# Patient Record
Sex: FEMALE | Race: WHITE | NOT HISPANIC OR LATINO | Employment: OTHER | URBAN - METROPOLITAN AREA
[De-identification: names, ages, dates, MRNs, and addresses within clinical notes are randomized per-mention and may not be internally consistent; named-entity substitution may affect disease eponyms.]

---

## 2019-09-04 ENCOUNTER — OFFICE VISIT (OUTPATIENT)
Dept: FAMILY MEDICINE CLINIC | Facility: CLINIC | Age: 59
End: 2019-09-04

## 2019-09-04 VITALS
WEIGHT: 150 LBS | BODY MASS INDEX: 28.32 KG/M2 | HEART RATE: 65 BPM | TEMPERATURE: 97.5 F | OXYGEN SATURATION: 97 % | SYSTOLIC BLOOD PRESSURE: 118 MMHG | DIASTOLIC BLOOD PRESSURE: 58 MMHG | HEIGHT: 61 IN

## 2019-09-04 DIAGNOSIS — R10.32 ABDOMINAL WALL PAIN IN LEFT LOWER QUADRANT: ICD-10-CM

## 2019-09-04 DIAGNOSIS — E89.0 POSTABLATIVE HYPOTHYROIDISM: Primary | ICD-10-CM

## 2019-09-04 DIAGNOSIS — B00.9 HSV INFECTION: ICD-10-CM

## 2019-09-04 DIAGNOSIS — K21.9 GASTROESOPHAGEAL REFLUX DISEASE WITHOUT ESOPHAGITIS: ICD-10-CM

## 2019-09-04 PROBLEM — E03.9 HYPOTHYROIDISM: Status: ACTIVE | Noted: 2019-09-04

## 2019-09-04 PROCEDURE — 99213 OFFICE O/P EST LOW 20 MIN: CPT | Performed by: FAMILY MEDICINE

## 2019-09-04 RX ORDER — VALACYCLOVIR HYDROCHLORIDE 500 MG/1
500 TABLET, FILM COATED ORAL DAILY
Qty: 90 TABLET | Refills: 3 | Status: SHIPPED | OUTPATIENT
Start: 2019-09-04 | End: 2020-10-12 | Stop reason: SDUPTHER

## 2019-09-04 RX ORDER — LEVOTHYROXINE SODIUM 137 MCG
137 TABLET ORAL DAILY
Qty: 90 TABLET | Refills: 3 | Status: SHIPPED | OUTPATIENT
Start: 2019-09-04 | End: 2020-10-06 | Stop reason: SDUPTHER

## 2019-09-04 RX ORDER — RANITIDINE 150 MG/1
150 TABLET ORAL 2 TIMES DAILY
Qty: 180 TABLET | Refills: 3 | Status: SHIPPED | OUTPATIENT
Start: 2019-09-04 | End: 2020-06-16 | Stop reason: RX

## 2019-09-04 RX ORDER — IBUPROFEN 800 MG/1
800 TABLET ORAL EVERY 6 HOURS PRN
Qty: 90 TABLET | Refills: 3 | Status: SHIPPED | OUTPATIENT
Start: 2019-09-04 | End: 2020-09-18 | Stop reason: SDUPTHER

## 2019-09-04 NOTE — PROGRESS NOTES
Subjective   Jackie Frankel is a 59 y.o. female.     Chief Complaint   Patient presents with   • Hypothyroidism        Patient here for routine checkup.  She is doing well with her thyroid and her reflux.  She is compliant with her medications and has no concerns with those today.  We do not have her old records available from the old office today except for the last labs which were good.  She does also complain of some left lower quadrant pain that is intermittent.  It is a sharp stabbing pain and is worse with movements.  She does not have any other aggravating factors that she knows.  It does not last very long.  She usually just lets it go away on its own.  She denies any change in her bowel such as black or tarry stools or constipation or diarrhea.  She denies any dysuria.  She is up-to-date on her colonoscopy and just had it recently.           The following portions of the patient's history were reviewed and updated as appropriate: allergies, current medications, past family history, past medical history, past social history, past surgical history and problem list.    Past Medical History:   Diagnosis Date   • Abnormal liver function test    • Abnormal Pap smear of vagina     Abnormal Pap smear of vagina and vaginal HPV   • Anxiety disorder    • Arthralgia of multiple sites    • BMI 27.0-27.9,adult    • Callus of foot    • Cervicalgia    • Colon polyps    • Eczema    • Edema extremities    • Encounter for gynecological examination without abnormal finding    • Encounter for long-term (current) use of medications    • Encounter for screening colonoscopy    • Encounter for screening for lipoid disorders    • Encounter for screening for malignant neoplasm of colon    • Generalized osteoarthritis of hand    • GERD (gastroesophageal reflux disease)    • HSV infection    • Hypothyroidism    • Left knee pain    • Menopausal symptoms    • Neck pain    • Neoplasm of uncertain behavior of skin    • Otalgia of right ear     • Pain in left shoulder    • Sleep disturbance    • Sore throat    • Strep pharyngitis    • Thyroid disease    • Viral warts    • Visit for screening mammogram        Past Surgical History:   Procedure Laterality Date   • COLONOSCOPY      Due to BRBPR 2002, 2014   • DILATATION AND CURETTAGE  2006   • EYE SURGERY         Family History   Problem Relation Age of Onset   • Arthritis Mother    • Dementia Mother    • Uterine cancer Mother    • Cirrhosis Father        Social History     Socioeconomic History   • Marital status:      Spouse name: Not on file   • Number of children: Not on file   • Years of education: Not on file   • Highest education level: Not on file   Tobacco Use   • Smoking status: Never Smoker   • Smokeless tobacco: Never Used   Substance and Sexual Activity   • Alcohol use: Yes     Frequency: Never     Comment: drinks about 5 times per year   • Drug use: No         Current Outpatient Medications:   •  ibuprofen (ADVIL,MOTRIN) 800 MG tablet, Take 1 tablet by mouth Every 6 (Six) Hours As Needed for Mild Pain ., Disp: 90 tablet, Rfl: 3  •  raNITIdine (ZANTAC) 150 MG tablet, Take 1 tablet by mouth 2 (Two) Times a Day., Disp: 180 tablet, Rfl: 3  •  SYNTHROID 137 MCG tablet, Take 1 tablet by mouth Daily., Disp: 90 tablet, Rfl: 3  •  valACYclovir (VALTREX) 500 MG tablet, Take 1 tablet by mouth Daily., Disp: 90 tablet, Rfl: 3    Review of Systems   Constitutional: Negative for chills, fatigue and fever.   HENT: Negative for congestion, rhinorrhea and sore throat.    Respiratory: Negative for cough and shortness of breath.    Cardiovascular: Negative for chest pain and leg swelling.   Gastrointestinal: Positive for abdominal pain.   Endocrine: Negative for polydipsia and polyuria.   Genitourinary: Negative for dysuria.   Musculoskeletal: Negative for arthralgias and myalgias.   Skin: Negative for rash.   Neurological: Negative for dizziness.   Hematological: Does not bruise/bleed easily.  "  Psychiatric/Behavioral: Negative for sleep disturbance.       Objective   Vitals:    09/04/19 0937   BP: 118/58   Pulse: 65   Temp: 97.5 °F (36.4 °C)   SpO2: 97%   Weight: 68 kg (150 lb)   Height: 154.9 cm (61\")     Body mass index is 28.34 kg/m².  Physical Exam   Constitutional: She is oriented to person, place, and time. She appears well-developed and well-nourished.   HENT:   Head: Normocephalic and atraumatic.   Eyes: Conjunctivae and EOM are normal. Pupils are equal, round, and reactive to light.   Neck: Neck supple. No thyromegaly present.   Cardiovascular: Normal rate and regular rhythm.   No murmur heard.  Pulmonary/Chest: Effort normal and breath sounds normal. She has no wheezes.   Abdominal: Soft. She exhibits no distension and no mass. There is no tenderness. There is no rebound and no guarding. No hernia.   She describes the area where she feels the pain as a region of the abdominal wall right above the left iliac crest.   Musculoskeletal: Normal range of motion.   Neurological: She is alert and oriented to person, place, and time. No cranial nerve deficit.   Skin: Skin is warm and dry.   Psychiatric: She has a normal mood and affect.   Vitals reviewed.        Assessment/Plan   Audelia was seen today for hypothyroidism.    Diagnoses and all orders for this visit:    Postablative hypothyroidism  -     TSH  -     SYNTHROID 137 MCG tablet; Take 1 tablet by mouth Daily.    Gastroesophageal reflux disease without esophagitis  -     raNITIdine (ZANTAC) 150 MG tablet; Take 1 tablet by mouth 2 (Two) Times a Day.    HSV infection  -     valACYclovir (VALTREX) 500 MG tablet; Take 1 tablet by mouth Daily.    Abdominal wall pain in left lower quadrant    Other orders  -     ibuprofen (ADVIL,MOTRIN) 800 MG tablet; Take 1 tablet by mouth Every 6 (Six) Hours As Needed for Mild Pain .               Patient Instructions   Let me know if pain in abdomen gets worse.       "

## 2019-09-05 LAB — TSH SERPL DL<=0.005 MIU/L-ACNC: 0.75 UIU/ML (ref 0.27–4.2)

## 2020-01-15 ENCOUNTER — OFFICE VISIT (OUTPATIENT)
Dept: FAMILY MEDICINE CLINIC | Facility: CLINIC | Age: 60
End: 2020-01-15

## 2020-01-15 VITALS
HEIGHT: 61 IN | TEMPERATURE: 98.2 F | SYSTOLIC BLOOD PRESSURE: 98 MMHG | OXYGEN SATURATION: 99 % | WEIGHT: 155.2 LBS | DIASTOLIC BLOOD PRESSURE: 58 MMHG | HEART RATE: 78 BPM | BODY MASS INDEX: 29.3 KG/M2

## 2020-01-15 DIAGNOSIS — L30.9 ECZEMA, UNSPECIFIED TYPE: ICD-10-CM

## 2020-01-15 DIAGNOSIS — R30.0 DYSURIA: ICD-10-CM

## 2020-01-15 DIAGNOSIS — N30.01 ACUTE CYSTITIS WITH HEMATURIA: Primary | ICD-10-CM

## 2020-01-15 LAB
BILIRUB BLD-MCNC: ABNORMAL MG/DL
GLUCOSE UR STRIP-MCNC: NEGATIVE MG/DL
KETONES UR QL: ABNORMAL
LEUKOCYTE EST, POC: ABNORMAL
NITRITE UR-MCNC: NEGATIVE MG/ML
PH UR: 5.5 [PH] (ref 5–8)
PROT UR STRIP-MCNC: ABNORMAL MG/DL
RBC # UR STRIP: ABNORMAL /UL
SP GR UR: 1.03 (ref 1–1.03)
UROBILINOGEN UR QL: NORMAL

## 2020-01-15 PROCEDURE — 99213 OFFICE O/P EST LOW 20 MIN: CPT | Performed by: FAMILY MEDICINE

## 2020-01-15 PROCEDURE — 81003 URINALYSIS AUTO W/O SCOPE: CPT | Performed by: FAMILY MEDICINE

## 2020-01-15 RX ORDER — NITROFURANTOIN 25; 75 MG/1; MG/1
100 CAPSULE ORAL 2 TIMES DAILY
Qty: 10 CAPSULE | Refills: 0 | Status: SHIPPED | OUTPATIENT
Start: 2020-01-15 | End: 2020-03-04

## 2020-01-15 RX ORDER — TRIAMCINOLONE ACETONIDE 1 MG/G
CREAM TOPICAL 2 TIMES DAILY
Qty: 80 G | Refills: 2 | Status: SHIPPED | OUTPATIENT
Start: 2020-01-15 | End: 2021-07-06 | Stop reason: SDUPTHER

## 2020-01-15 NOTE — PROGRESS NOTES
Subjective   Jackie Frankel is a 59 y.o. female.     Chief Complaint   Patient presents with   • Difficulty Urinating        Patient was feeling fine until earlier today when she went to the bathroom and felt some pain and urgency in her bladder.  She is not had any fever chills nausea or vomiting.  She is had some frequency since then.  She has not noticed any blood in her urine.         The following portions of the patient's history were reviewed and updated as appropriate: allergies, current medications, past family history, past medical history, past social history, past surgical history and problem list.    Past Medical History:   Diagnosis Date   • Abnormal liver function test    • Abnormal Pap smear of vagina     Abnormal Pap smear of vagina and vaginal HPV   • Anxiety disorder    • Arthralgia of multiple sites    • BMI 27.0-27.9,adult    • Callus of foot    • Cervicalgia    • Colon polyps    • Eczema    • Edema extremities    • Encounter for gynecological examination without abnormal finding    • Encounter for long-term (current) use of medications    • Encounter for screening colonoscopy    • Encounter for screening for lipoid disorders    • Encounter for screening for malignant neoplasm of colon    • Generalized osteoarthritis of hand    • GERD (gastroesophageal reflux disease)    • HSV infection    • Hypothyroidism    • Left knee pain    • Menopausal symptoms    • Neck pain    • Neoplasm of uncertain behavior of skin    • Otalgia of right ear    • Pain in left shoulder    • Sleep disturbance    • Sore throat    • Strep pharyngitis    • Thyroid disease    • Viral warts    • Visit for screening mammogram        Past Surgical History:   Procedure Laterality Date   • COLONOSCOPY      Due to BRBPR 2002, 2014   • DILATATION AND CURETTAGE  2006   • EYE SURGERY         Family History   Problem Relation Age of Onset   • Arthritis Mother    • Dementia Mother    • Uterine cancer Mother    • Cirrhosis Father         Social History     Socioeconomic History   • Marital status:      Spouse name: Not on file   • Number of children: Not on file   • Years of education: Not on file   • Highest education level: Not on file   Tobacco Use   • Smoking status: Never Smoker   • Smokeless tobacco: Never Used   Substance and Sexual Activity   • Alcohol use: Yes     Frequency: Never     Comment: drinks about 5 times per year   • Drug use: No         Current Outpatient Medications:   •  ibuprofen (ADVIL,MOTRIN) 800 MG tablet, Take 1 tablet by mouth Every 6 (Six) Hours As Needed for Mild Pain ., Disp: 90 tablet, Rfl: 3  •  raNITIdine (ZANTAC) 150 MG tablet, Take 1 tablet by mouth 2 (Two) Times a Day., Disp: 180 tablet, Rfl: 3  •  SYNTHROID 137 MCG tablet, Take 1 tablet by mouth Daily., Disp: 90 tablet, Rfl: 3  •  valACYclovir (VALTREX) 500 MG tablet, Take 1 tablet by mouth Daily., Disp: 90 tablet, Rfl: 3  •  nitrofurantoin, macrocrystal-monohydrate, (MACROBID) 100 MG capsule, Take 1 capsule by mouth 2 (Two) Times a Day., Disp: 10 capsule, Rfl: 0  •  triamcinolone (KENALOG) 0.1 % cream, Apply  topically to the appropriate area as directed 2 (Two) Times a Day., Disp: 80 g, Rfl: 2    Review of Systems   Constitutional: Negative for chills, fatigue and fever.   HENT: Negative for congestion, rhinorrhea and sore throat.    Respiratory: Negative for cough and shortness of breath.    Cardiovascular: Negative for chest pain and leg swelling.   Gastrointestinal: Negative for abdominal pain, nausea and vomiting.   Endocrine: Negative for polydipsia and polyuria.   Genitourinary: Positive for dysuria, frequency, pelvic pain and urgency. Negative for flank pain.   Musculoskeletal: Negative for arthralgias and myalgias.   Skin: Negative for rash.   Neurological: Negative for dizziness.   Hematological: Does not bruise/bleed easily.   Psychiatric/Behavioral: Negative for sleep disturbance.       Objective   Vitals:    01/15/20 1420   BP: 98/58  "  Pulse: 78   Temp: 98.2 °F (36.8 °C)   SpO2: 99%   Weight: 70.4 kg (155 lb 3.2 oz)   Height: 154.9 cm (61\")     Body mass index is 29.32 kg/m².  Physical Exam   Constitutional: She is oriented to person, place, and time. She appears well-developed and well-nourished.   HENT:   Head: Normocephalic and atraumatic.   Eyes: Pupils are equal, round, and reactive to light. Conjunctivae and EOM are normal.   Neck: Neck supple. No thyromegaly present.   Cardiovascular: Normal rate and regular rhythm.   No murmur heard.  Pulmonary/Chest: Effort normal and breath sounds normal. She has no wheezes.   Abdominal: Soft. Bowel sounds are normal. There is no tenderness.   Musculoskeletal: Normal range of motion.   Neurological: She is alert and oriented to person, place, and time. No cranial nerve deficit.   Skin: Skin is warm and dry.   Psychiatric: She has a normal mood and affect.       Office Visit on 01/15/2020   Component Date Value Ref Range Status   • Specific Gravity  01/15/2020 1.030  1.005 - 1.030 Final   • pH, Urine 01/15/2020 5.5  5.0 - 8.0 Final   • Leukocytes 01/15/2020 Moderate (2+)* Negative Final   • Nitrite, UA 01/15/2020 Negative  Negative Final   • Protein, POC 01/15/2020 2+* Negative mg/dL Final   • Glucose, UA 01/15/2020 Negative  Negative, 1000 mg/dL (3+) mg/dL Final   • Ketones, UA 01/15/2020 Trace* Negative Final   • Urobilinogen, UA 01/15/2020 Normal  Normal Final   • Bilirubin 01/15/2020 Moderate (2+)* Negative Final   • Blood, UA 01/15/2020 3+* Negative Final          Assessment/Plan   Audelia was seen today for difficulty urinating.    Diagnoses and all orders for this visit:    Acute cystitis with hematuria  -     nitrofurantoin, macrocrystal-monohydrate, (MACROBID) 100 MG capsule; Take 1 capsule by mouth 2 (Two) Times a Day.    Dysuria  -     POC Urinalysis Dipstick, Automated  -     Urine Culture - Urine, Urine, Clean Catch    Eczema, unspecified type  -     triamcinolone (KENALOG) 0.1 % cream; " Apply  topically to the appropriate area as directed 2 (Two) Times a Day.               Patient Instructions   Push fluids.

## 2020-01-18 LAB
BACTERIA UR CULT: ABNORMAL
BACTERIA UR CULT: ABNORMAL
OTHER ANTIBIOTIC SUSC ISLT: ABNORMAL

## 2020-03-04 ENCOUNTER — OFFICE VISIT (OUTPATIENT)
Dept: FAMILY MEDICINE CLINIC | Facility: CLINIC | Age: 60
End: 2020-03-04

## 2020-03-04 VITALS
DIASTOLIC BLOOD PRESSURE: 76 MMHG | HEIGHT: 61 IN | HEART RATE: 73 BPM | OXYGEN SATURATION: 98 % | WEIGHT: 153.8 LBS | BODY MASS INDEX: 29.04 KG/M2 | TEMPERATURE: 97.6 F | SYSTOLIC BLOOD PRESSURE: 110 MMHG

## 2020-03-04 DIAGNOSIS — L30.9 ECZEMA, UNSPECIFIED TYPE: ICD-10-CM

## 2020-03-04 DIAGNOSIS — E78.5 HYPERLIPIDEMIA, UNSPECIFIED HYPERLIPIDEMIA TYPE: ICD-10-CM

## 2020-03-04 DIAGNOSIS — M25.552 ISCHIAL PAIN, LEFT: ICD-10-CM

## 2020-03-04 DIAGNOSIS — E89.0 POSTABLATIVE HYPOTHYROIDISM: Primary | ICD-10-CM

## 2020-03-04 DIAGNOSIS — G47.9 SLEEP DISTURBANCE: ICD-10-CM

## 2020-03-04 DIAGNOSIS — B00.9 HSV INFECTION: ICD-10-CM

## 2020-03-04 DIAGNOSIS — K21.9 GASTROESOPHAGEAL REFLUX DISEASE WITHOUT ESOPHAGITIS: ICD-10-CM

## 2020-03-04 PROCEDURE — 99214 OFFICE O/P EST MOD 30 MIN: CPT | Performed by: FAMILY MEDICINE

## 2020-03-04 NOTE — PATIENT INSTRUCTIONS
Try the melatonin at 10 mg at night.  Increase exercise to help sleep.  Establish a more consistent wake time and bedtime.  Try building up your gluteus muscles for the ischial pain.  Squats can help.  You can also use a gel cushion.

## 2020-03-04 NOTE — PROGRESS NOTES
Subjective   Jackie Frankel is a 60 y.o. female.     Chief Complaint   Patient presents with   • Hypothyroidism        Here for routine thyroid check up.  Compliant with meds.  She is fasting for her cholesterol check as well.  She has controlled HSV and takes daily Valtrex for that.  Her eczema has been stable with her triamcinolone.  She still requires medicine for her reflux on a daily basis.  Pain in left buttock for the past few months after sitting for a while.  She denies any pain in the hip and denies any injuries.  Has not been sleeping well for some time.  Has tried up to 5 mg melatonin.  Sometimes gets up to 6 hours.    She has an irregular bedtime and wake up time due to babysitting grandchildren.  She is due for an annual physical.           The following portions of the patient's history were reviewed and updated as appropriate: allergies, current medications, past family history, past medical history, past social history, past surgical history and problem list.    Past Medical History:   Diagnosis Date   • Abnormal liver function test    • Abnormal Pap smear of vagina     Abnormal Pap smear of vagina and vaginal HPV   • Anxiety disorder    • Arthralgia of multiple sites    • BMI 27.0-27.9,adult    • Callus of foot    • Cervicalgia    • Colon polyps    • Eczema    • Edema extremities    • Encounter for gynecological examination without abnormal finding    • Encounter for long-term (current) use of medications    • Encounter for screening colonoscopy    • Encounter for screening for lipoid disorders    • Encounter for screening for malignant neoplasm of colon    • Generalized osteoarthritis of hand    • GERD (gastroesophageal reflux disease)    • HSV infection    • Hypothyroidism    • Left knee pain    • Menopausal symptoms    • Neck pain    • Neoplasm of uncertain behavior of skin    • Otalgia of right ear    • Pain in left shoulder    • Sleep disturbance    • Sore throat    • Strep pharyngitis    •  Thyroid disease    • Viral warts    • Visit for screening mammogram        Past Surgical History:   Procedure Laterality Date   • COLONOSCOPY      Due to BRBPR 2002, 2014   • DILATATION AND CURETTAGE  2006   • EYE SURGERY         Family History   Problem Relation Age of Onset   • Arthritis Mother    • Dementia Mother    • Uterine cancer Mother    • Cirrhosis Father        Social History     Socioeconomic History   • Marital status:      Spouse name: Not on file   • Number of children: Not on file   • Years of education: Not on file   • Highest education level: Not on file   Tobacco Use   • Smoking status: Never Smoker   • Smokeless tobacco: Never Used   Substance and Sexual Activity   • Alcohol use: Yes     Frequency: Never     Comment: drinks about 5 times per year   • Drug use: No         Current Outpatient Medications:   •  ibuprofen (ADVIL,MOTRIN) 800 MG tablet, Take 1 tablet by mouth Every 6 (Six) Hours As Needed for Mild Pain ., Disp: 90 tablet, Rfl: 3  •  raNITIdine (ZANTAC) 150 MG tablet, Take 1 tablet by mouth 2 (Two) Times a Day., Disp: 180 tablet, Rfl: 3  •  SYNTHROID 137 MCG tablet, Take 1 tablet by mouth Daily., Disp: 90 tablet, Rfl: 3  •  triamcinolone (KENALOG) 0.1 % cream, Apply  topically to the appropriate area as directed 2 (Two) Times a Day., Disp: 80 g, Rfl: 2  •  valACYclovir (VALTREX) 500 MG tablet, Take 1 tablet by mouth Daily., Disp: 90 tablet, Rfl: 3    Review of Systems   Constitutional: Negative for chills, fatigue and fever.   HENT: Negative for congestion, rhinorrhea and sore throat.    Respiratory: Negative for cough and shortness of breath.    Cardiovascular: Negative for chest pain and leg swelling.   Gastrointestinal: Negative for abdominal pain.   Endocrine: Negative for polydipsia and polyuria.   Genitourinary: Negative for dysuria.   Musculoskeletal: Positive for arthralgias. Negative for myalgias.   Skin: Negative for rash.   Neurological: Negative for dizziness.  "  Hematological: Does not bruise/bleed easily.   Psychiatric/Behavioral: Positive for sleep disturbance.       Objective   Vitals:    03/04/20 0802   BP: 110/76   Pulse: 73   Temp: 97.6 °F (36.4 °C)   SpO2: 98%   Weight: 69.8 kg (153 lb 12.8 oz)   Height: 154.9 cm (61\")     Body mass index is 29.06 kg/m².  Physical Exam   Constitutional: She is oriented to person, place, and time. She appears well-developed and well-nourished.   HENT:   Head: Normocephalic and atraumatic.   Eyes: Pupils are equal, round, and reactive to light. Conjunctivae and EOM are normal.   Neck: Neck supple. No thyromegaly present.   Cardiovascular: Normal rate and regular rhythm.   No murmur heard.  Pulmonary/Chest: Effort normal and breath sounds normal. She has no wheezes.   Abdominal: Soft.   Musculoskeletal: Normal range of motion.   Hip nontender with full range of motion.  Patient is point tender over ischium on the left.   Neurological: She is alert and oriented to person, place, and time. No cranial nerve deficit.   Skin: Skin is warm and dry.   Psychiatric: She has a normal mood and affect.         Assessment/Plan   Audelia was seen today for hypothyroidism.    Diagnoses and all orders for this visit:    Postablative hypothyroidism  -     TSH    Gastroesophageal reflux disease without esophagitis    Hyperlipidemia, unspecified hyperlipidemia type  -     CBC & Differential  -     Comprehensive Metabolic Panel  -     Lipid Panel    Sleep disturbance    Ischial pain, left    Eczema, unspecified type    HSV infection               Patient Instructions   Try the melatonin at 10 mg at night.  Increase exercise to help sleep.  Establish a more consistent wake time and bedtime.  Try building up your gluteus muscles for the ischial pain.  Squats can help.  You can also use a gel cushion.          "

## 2020-03-05 LAB
ALBUMIN SERPL-MCNC: 4.4 G/DL (ref 3.5–5.2)
ALBUMIN/GLOB SERPL: 1.8 G/DL
ALP SERPL-CCNC: 70 U/L (ref 39–117)
ALT SERPL-CCNC: 15 U/L (ref 1–33)
AST SERPL-CCNC: 22 U/L (ref 1–32)
BASOPHILS # BLD AUTO: 0.05 10*3/MM3 (ref 0–0.2)
BASOPHILS NFR BLD AUTO: 1.3 % (ref 0–1.5)
BILIRUB SERPL-MCNC: 0.5 MG/DL (ref 0.2–1.2)
BUN SERPL-MCNC: 18 MG/DL (ref 8–23)
BUN/CREAT SERPL: 21.4 (ref 7–25)
CALCIUM SERPL-MCNC: 9.1 MG/DL (ref 8.6–10.5)
CHLORIDE SERPL-SCNC: 100 MMOL/L (ref 98–107)
CHOLEST SERPL-MCNC: 191 MG/DL (ref 0–200)
CO2 SERPL-SCNC: 26.6 MMOL/L (ref 22–29)
CREAT SERPL-MCNC: 0.84 MG/DL (ref 0.57–1)
EOSINOPHIL # BLD AUTO: 0.13 10*3/MM3 (ref 0–0.4)
EOSINOPHIL NFR BLD AUTO: 3.4 % (ref 0.3–6.2)
ERYTHROCYTE [DISTWIDTH] IN BLOOD BY AUTOMATED COUNT: 12.4 % (ref 12.3–15.4)
GLOBULIN SER CALC-MCNC: 2.4 GM/DL
GLUCOSE SERPL-MCNC: 84 MG/DL (ref 65–99)
HCT VFR BLD AUTO: 37.6 % (ref 34–46.6)
HDLC SERPL-MCNC: 66 MG/DL (ref 40–60)
HGB BLD-MCNC: 12.5 G/DL (ref 12–15.9)
IMM GRANULOCYTES # BLD AUTO: 0 10*3/MM3 (ref 0–0.05)
IMM GRANULOCYTES NFR BLD AUTO: 0 % (ref 0–0.5)
LDLC SERPL CALC-MCNC: 114 MG/DL (ref 0–100)
LYMPHOCYTES # BLD AUTO: 1.4 10*3/MM3 (ref 0.7–3.1)
LYMPHOCYTES NFR BLD AUTO: 36.4 % (ref 19.6–45.3)
MCH RBC QN AUTO: 31.2 PG (ref 26.6–33)
MCHC RBC AUTO-ENTMCNC: 33.2 G/DL (ref 31.5–35.7)
MCV RBC AUTO: 93.8 FL (ref 79–97)
MONOCYTES # BLD AUTO: 0.44 10*3/MM3 (ref 0.1–0.9)
MONOCYTES NFR BLD AUTO: 11.4 % (ref 5–12)
NEUTROPHILS # BLD AUTO: 1.83 10*3/MM3 (ref 1.7–7)
NEUTROPHILS NFR BLD AUTO: 47.5 % (ref 42.7–76)
NRBC BLD AUTO-RTO: 0 /100 WBC (ref 0–0.2)
PLATELET # BLD AUTO: 245 10*3/MM3 (ref 140–450)
POTASSIUM SERPL-SCNC: 4.5 MMOL/L (ref 3.5–5.2)
PROT SERPL-MCNC: 6.8 G/DL (ref 6–8.5)
RBC # BLD AUTO: 4.01 10*6/MM3 (ref 3.77–5.28)
SODIUM SERPL-SCNC: 137 MMOL/L (ref 136–145)
TRIGL SERPL-MCNC: 53 MG/DL (ref 0–150)
TSH SERPL DL<=0.005 MIU/L-ACNC: 0.76 UIU/ML (ref 0.27–4.2)
VLDLC SERPL CALC-MCNC: 10.6 MG/DL
WBC # BLD AUTO: 3.85 10*3/MM3 (ref 3.4–10.8)

## 2020-06-15 ENCOUNTER — TELEPHONE (OUTPATIENT)
Dept: FAMILY MEDICINE CLINIC | Facility: CLINIC | Age: 60
End: 2020-06-15

## 2020-06-15 NOTE — TELEPHONE ENCOUNTER
Pt is needing a refill on Ranitidine, but since this medication has been recalled it will need to be changed to something else.

## 2020-06-16 RX ORDER — FAMOTIDINE 20 MG/1
20 TABLET, FILM COATED ORAL 2 TIMES DAILY
Qty: 180 TABLET | Refills: 3 | Status: SHIPPED | OUTPATIENT
Start: 2020-06-16 | End: 2021-06-07

## 2020-09-03 ENCOUNTER — OFFICE VISIT (OUTPATIENT)
Dept: FAMILY MEDICINE CLINIC | Facility: CLINIC | Age: 60
End: 2020-09-03

## 2020-09-03 VITALS
BODY MASS INDEX: 27.11 KG/M2 | OXYGEN SATURATION: 98 % | HEIGHT: 61 IN | WEIGHT: 143.6 LBS | SYSTOLIC BLOOD PRESSURE: 108 MMHG | DIASTOLIC BLOOD PRESSURE: 60 MMHG | HEART RATE: 80 BPM | TEMPERATURE: 97.3 F

## 2020-09-03 DIAGNOSIS — Z00.00 ROUTINE HEALTH MAINTENANCE: Primary | ICD-10-CM

## 2020-09-03 DIAGNOSIS — K21.9 GASTROESOPHAGEAL REFLUX DISEASE WITHOUT ESOPHAGITIS: ICD-10-CM

## 2020-09-03 DIAGNOSIS — Z23 NEED FOR INFLUENZA VACCINATION: ICD-10-CM

## 2020-09-03 DIAGNOSIS — E89.0 POSTABLATIVE HYPOTHYROIDISM: ICD-10-CM

## 2020-09-03 PROCEDURE — 99396 PREV VISIT EST AGE 40-64: CPT | Performed by: FAMILY MEDICINE

## 2020-09-03 PROCEDURE — 90471 IMMUNIZATION ADMIN: CPT | Performed by: FAMILY MEDICINE

## 2020-09-03 PROCEDURE — 90686 IIV4 VACC NO PRSV 0.5 ML IM: CPT | Performed by: FAMILY MEDICINE

## 2020-09-03 NOTE — PROGRESS NOTES
Subjective   Jackie Frankel is a 60 y.o. female who presents for annual female wellness exam.  Chief Complaint   Patient presents with   • Annual Exam   • Hot Flashes     Patient doing well in general.  She is due for TSH today.  But did her fasting labs back in the spring.  She is compliant with her medications.  She has had a few hot flashes recently.    Menstrual History: early 50s  Pregnancy History: G0  Sexual History: with    Contraception: na  Hormone Replacement Therapy: na  Diet: standard  Exercise: some  Do you feel safe? Yes   Have you ever been abused? no    Mammogram: due  Pap Smear: 2018  Bone Density: due  Colon Cancer Screenin    Immunization History   Administered Date(s) Administered   • Flu Vaccine Intradermal Quad 18-64YR 10/17/2016, 2017, 10/11/2018   • [place] Quad =>18yrs 10/03/2019       The following portions of the patient's history were reviewed and updated as appropriate: allergies, current medications, past family history, past medical history, past social history, past surgical history and problem list.    Past Medical History:   Diagnosis Date   • Abnormal liver function test    • Abnormal Pap smear of vagina     Abnormal Pap smear of vagina and vaginal HPV   • Anxiety disorder    • Arthralgia of multiple sites    • BMI 27.0-27.9,adult    • Callus of foot    • Cervicalgia    • Colon polyps    • Eczema    • Edema extremities    • Encounter for gynecological examination without abnormal finding    • Encounter for long-term (current) use of medications    • Encounter for screening colonoscopy    • Encounter for screening for lipoid disorders    • Encounter for screening for malignant neoplasm of colon    • Generalized osteoarthritis of hand    • GERD (gastroesophageal reflux disease)    • HSV infection    • Hypothyroidism    • Left knee pain    • Menopausal symptoms    • Neck pain    • Neoplasm of uncertain behavior of skin    • Otalgia of right ear    • Pain in left  shoulder    • Sleep disturbance    • Sore throat    • Strep pharyngitis    • Thyroid disease    • Viral warts    • Visit for screening mammogram        Past Surgical History:   Procedure Laterality Date   • COLONOSCOPY      Due to BRBPR 2002, 2014   • DILATATION AND CURETTAGE  2006   • EYE SURGERY         Family History   Problem Relation Age of Onset   • Arthritis Mother    • Dementia Mother    • Uterine cancer Mother    • Cirrhosis Father        Social History     Socioeconomic History   • Marital status:      Spouse name: Not on file   • Number of children: Not on file   • Years of education: Not on file   • Highest education level: Not on file   Tobacco Use   • Smoking status: Never Smoker   • Smokeless tobacco: Never Used   Substance and Sexual Activity   • Alcohol use: Yes     Frequency: Never     Comment: drinks about 5 times per year   • Drug use: No         Current Outpatient Medications:   •  famotidine (Pepcid) 20 MG tablet, Take 1 tablet by mouth 2 (Two) Times a Day., Disp: 180 tablet, Rfl: 3  •  ibuprofen (ADVIL,MOTRIN) 800 MG tablet, Take 1 tablet by mouth Every 6 (Six) Hours As Needed for Mild Pain ., Disp: 90 tablet, Rfl: 3  •  SYNTHROID 137 MCG tablet, Take 1 tablet by mouth Daily., Disp: 90 tablet, Rfl: 3  •  triamcinolone (KENALOG) 0.1 % cream, Apply  topically to the appropriate area as directed 2 (Two) Times a Day., Disp: 80 g, Rfl: 2  •  valACYclovir (VALTREX) 500 MG tablet, Take 1 tablet by mouth Daily., Disp: 90 tablet, Rfl: 3    Review of Systems   Constitutional: Negative for chills, fatigue and fever.   HENT: Negative for congestion, rhinorrhea and sore throat.    Respiratory: Negative for cough and shortness of breath.    Cardiovascular: Negative for chest pain and leg swelling.   Gastrointestinal: Negative for abdominal pain.   Endocrine: Negative for polydipsia and polyuria.   Genitourinary: Negative for dysuria.   Musculoskeletal: Negative for arthralgias and myalgias.   Skin:  "Negative for rash.   Neurological: Negative for dizziness.   Hematological: Does not bruise/bleed easily.   Psychiatric/Behavioral: Negative for sleep disturbance.       Objective   Vitals:    09/03/20 1251   BP: 108/60   Pulse: 80   Temp: 97.3 °F (36.3 °C)   SpO2: 98%   Weight: 65.1 kg (143 lb 9.6 oz)   Height: 154.9 cm (61\")     Body mass index is 27.13 kg/m².  Physical Exam   Constitutional: She is oriented to person, place, and time. She appears well-developed and well-nourished.   HENT:   Head: Normocephalic and atraumatic.   Eyes: Pupils are equal, round, and reactive to light. Conjunctivae and EOM are normal.   Neck: Neck supple. No thyromegaly present.   Cardiovascular: Normal rate and regular rhythm.   No murmur heard.  Pulmonary/Chest: Effort normal and breath sounds normal. She has no wheezes.   Abdominal: Soft. Bowel sounds are normal. She exhibits no mass. There is no tenderness. There is no rebound.   Musculoskeletal: Normal range of motion.   Neurological: She is alert and oriented to person, place, and time. No cranial nerve deficit.   Skin: Skin is warm and dry.   Psychiatric: She has a normal mood and affect.         Assessment/Plan   Audelia was seen today for annual exam and hot flashes.    Diagnoses and all orders for this visit:    Routine health maintenance    Postablative hypothyroidism  -     TSH    Gastroesophageal reflux disease without esophagitis    Need for influenza vaccination  -     Fluarix/Fluzone/Afluria Quad>6 Months               Discussed the importance of maintaining a healthy weight and getting regular exercise.  Educated patient on the benefits of healthy diet.  Advise follow-up annually for wellness exams.    There are no Patient Instructions on file for this visit.      "

## 2020-09-04 LAB — TSH SERPL DL<=0.005 MIU/L-ACNC: 0.11 UIU/ML (ref 0.45–4.5)

## 2020-09-17 DIAGNOSIS — Z78.0 MENOPAUSE: Primary | ICD-10-CM

## 2020-09-17 RX ORDER — IBUPROFEN 800 MG/1
800 TABLET ORAL EVERY 6 HOURS PRN
Qty: 90 TABLET | Refills: 3 | Status: CANCELLED | OUTPATIENT
Start: 2020-09-17

## 2020-09-18 ENCOUNTER — OFFICE VISIT (OUTPATIENT)
Dept: FAMILY MEDICINE CLINIC | Facility: CLINIC | Age: 60
End: 2020-09-18

## 2020-09-18 VITALS
BODY MASS INDEX: 27.23 KG/M2 | DIASTOLIC BLOOD PRESSURE: 62 MMHG | OXYGEN SATURATION: 98 % | WEIGHT: 144.2 LBS | TEMPERATURE: 96.9 F | HEART RATE: 66 BPM | SYSTOLIC BLOOD PRESSURE: 128 MMHG | HEIGHT: 61 IN

## 2020-09-18 DIAGNOSIS — R30.0 DYSURIA: Primary | ICD-10-CM

## 2020-09-18 LAB
BILIRUB BLD-MCNC: NEGATIVE MG/DL
GLUCOSE UR STRIP-MCNC: NEGATIVE MG/DL
KETONES UR QL: NEGATIVE
LEUKOCYTE EST, POC: NEGATIVE
NITRITE UR-MCNC: NEGATIVE MG/ML
PH UR: 7 [PH] (ref 5–8)
PROT UR STRIP-MCNC: NEGATIVE MG/DL
RBC # UR STRIP: NEGATIVE /UL
SP GR UR: 1.01 (ref 1–1.03)
UROBILINOGEN UR QL: NORMAL

## 2020-09-18 PROCEDURE — 99213 OFFICE O/P EST LOW 20 MIN: CPT | Performed by: FAMILY MEDICINE

## 2020-09-18 PROCEDURE — 81003 URINALYSIS AUTO W/O SCOPE: CPT | Performed by: FAMILY MEDICINE

## 2020-09-18 RX ORDER — IBUPROFEN 800 MG/1
800 TABLET ORAL EVERY 6 HOURS PRN
Qty: 90 TABLET | Refills: 3 | Status: SHIPPED | OUTPATIENT
Start: 2020-09-18 | End: 2021-09-13

## 2020-09-18 NOTE — PROGRESS NOTES
Subjective   Jackie Frankel is a 60 y.o. female.     Chief Complaint   Patient presents with   • Difficulty Urinating        Has been feeling pressure in her bladder for the past week.  Has been having some frequency.  Trying to drink more water.  Home test did not show much.  No hematuria.  No vaginal D/C.  Her  bought her Azo but never tried it.      Difficulty Urinating  Pertinent negatives include no chills, nausea or vomiting.   Dysuria   This is a recurrent problem. The current episode started in the past 7 days. The problem occurs intermittently. The problem has been waxing and waning. The quality of the pain is described as aching. The pain is at a severity of 6/10. There has been no fever. She is sexually active. There is no history of pyelonephritis. Associated symptoms include frequency. Pertinent negatives include no chills, discharge, flank pain, hematuria, hesitancy, nausea, possible pregnancy, sweats, urgency or vomiting.          The following portions of the patient's history were reviewed and updated as appropriate: allergies, current medications, past family history, past medical history, past social history, past surgical history and problem list.    Past Medical History:   Diagnosis Date   • Abnormal liver function test    • Abnormal Pap smear of vagina     Abnormal Pap smear of vagina and vaginal HPV   • Anxiety disorder    • Arthralgia of multiple sites    • BMI 27.0-27.9,adult    • Callus of foot    • Cervicalgia    • Colon polyps    • Eczema    • Edema extremities    • Encounter for gynecological examination without abnormal finding    • Encounter for long-term (current) use of medications    • Encounter for screening colonoscopy    • Encounter for screening for lipoid disorders    • Encounter for screening for malignant neoplasm of colon    • Generalized osteoarthritis of hand    • GERD (gastroesophageal reflux disease)    • HSV infection    • Hypothyroidism    • Left knee pain    •  Menopausal symptoms    • Neck pain    • Neoplasm of uncertain behavior of skin    • Otalgia of right ear    • Pain in left shoulder    • Sleep disturbance    • Sore throat    • Strep pharyngitis    • Thyroid disease    • Viral warts    • Visit for screening mammogram        Past Surgical History:   Procedure Laterality Date   • COLONOSCOPY      Due to BRBPR 2002, 2014   • DILATATION AND CURETTAGE  2006   • EYE SURGERY         Family History   Problem Relation Age of Onset   • Arthritis Mother    • Dementia Mother    • Uterine cancer Mother    • Cirrhosis Father        Social History     Socioeconomic History   • Marital status:      Spouse name: Not on file   • Number of children: Not on file   • Years of education: Not on file   • Highest education level: Not on file   Tobacco Use   • Smoking status: Never Smoker   • Smokeless tobacco: Never Used   Substance and Sexual Activity   • Alcohol use: Yes     Frequency: Never     Comment: drinks about 5 times per year   • Drug use: No         Current Outpatient Medications:   •  famotidine (Pepcid) 20 MG tablet, Take 1 tablet by mouth 2 (Two) Times a Day., Disp: 180 tablet, Rfl: 3  •  ibuprofen (ADVIL,MOTRIN) 800 MG tablet, Take 1 tablet by mouth Every 6 (Six) Hours As Needed for Mild Pain ., Disp: 90 tablet, Rfl: 3  •  SYNTHROID 137 MCG tablet, Take 1 tablet by mouth Daily., Disp: 90 tablet, Rfl: 3  •  triamcinolone (KENALOG) 0.1 % cream, Apply  topically to the appropriate area as directed 2 (Two) Times a Day., Disp: 80 g, Rfl: 2  •  valACYclovir (VALTREX) 500 MG tablet, Take 1 tablet by mouth Daily., Disp: 90 tablet, Rfl: 3    Review of Systems   Constitutional: Negative for chills.   HENT: Negative.    Gastrointestinal: Negative for nausea and vomiting.   Genitourinary: Positive for difficulty urinating, dysuria and frequency. Negative for flank pain, hematuria, hesitancy and urgency.       Objective   Vitals:    09/18/20 0956   BP: 128/62   Pulse: 66   Temp:  "96.9 °F (36.1 °C)   SpO2: 98%   Weight: 65.4 kg (144 lb 3.2 oz)   Height: 154.9 cm (61\")     Body mass index is 27.25 kg/m².  Physical Exam  Vitals signs and nursing note reviewed.   Constitutional:       General: She is not in acute distress.     Appearance: She is well-developed.   HENT:      Head: Normocephalic and atraumatic.      Mouth/Throat:      Mouth: Mucous membranes are moist.   Eyes:      Conjunctiva/sclera: Conjunctivae normal.      Pupils: Pupils are equal, round, and reactive to light.   Cardiovascular:      Rate and Rhythm: Tachycardia present.   Pulmonary:      Effort: Pulmonary effort is normal. No respiratory distress.   Abdominal:      General: Abdomen is flat. Bowel sounds are normal.      Palpations: Abdomen is soft.      Tenderness: There is no abdominal tenderness.   Neurological:      Mental Status: She is alert and oriented to person, place, and time.   Psychiatric:         Behavior: Behavior normal.         Office Visit on 09/18/2020   Component Date Value Ref Range Status   • Specific Gravity  09/18/2020 1.010  1.005 - 1.030 Final   • pH, Urine 09/18/2020 7.0  5.0 - 8.0 Final   • Leukocytes 09/18/2020 Negative  Negative Final   • Nitrite, UA 09/18/2020 Negative  Negative Final   • Protein, POC 09/18/2020 Negative  Negative mg/dL Final   • Glucose, UA 09/18/2020 Negative  Negative, 1000 mg/dL (3+) mg/dL Final   • Ketones, UA 09/18/2020 Negative  Negative Final   • Urobilinogen, UA 09/18/2020 Normal  Normal Final   • Bilirubin 09/18/2020 Negative  Negative Final   • Blood, UA 09/18/2020 Negative  Negative Final          Assessment/Plan   Audelia was seen today for difficulty urinating.    Diagnoses and all orders for this visit:    Dysuria  -     POC Urinalysis Dipstick, Automated  -     Urine Culture - Urine, Urine, Clean Catch    Other orders  -     ibuprofen (ADVIL,MOTRIN) 800 MG tablet; Take 1 tablet by mouth Every 6 (Six) Hours As Needed for Mild Pain .               Patient " Instructions   Follow up pending urine culture.      Answers for HPI/ROS submitted by the patient on 9/17/2020   Dysuria  What is the primary reason for your visit?: Painful Urination

## 2020-09-20 LAB
BACTERIA UR CULT: NORMAL
BACTERIA UR CULT: NORMAL
Lab: NORMAL

## 2020-09-22 DIAGNOSIS — Z78.0 MENOPAUSE: ICD-10-CM

## 2020-10-06 DIAGNOSIS — E89.0 POSTABLATIVE HYPOTHYROIDISM: ICD-10-CM

## 2020-10-06 RX ORDER — LEVOTHYROXINE SODIUM 137 MCG
137 TABLET ORAL DAILY
Qty: 90 TABLET | Refills: 3 | Status: SHIPPED | OUTPATIENT
Start: 2020-10-06 | End: 2020-12-09 | Stop reason: DRUGHIGH

## 2020-10-06 NOTE — TELEPHONE ENCOUNTER
Caller: FrankelAudelia coombs    Relationship: Self    Best call back number: [phone]    Medication needed:   Requested Prescriptions     Pending Prescriptions Disp Refills   • Synthroid 137 MCG tablet 90 tablet 3     Sig: Take 1 tablet by mouth Daily.       When do you need the refill by: 10/8/2020    What details did the patient provide when requesting the medication: PATIENT INDICATED SHE WILL BE OUT AFTER Thursday , PHARMACY HAD FAXED TO INCORRECT OFFICE ORIGINALLY     Does the patient have less than a 3 day supply:  [x] Yes  [] No    What is the patient's preferred pharmacy: [facility] [address] - [address] [place] [address] AT  [address] - [phone] PH - [phone] FX

## 2020-10-12 DIAGNOSIS — B00.9 HSV INFECTION: ICD-10-CM

## 2020-10-12 RX ORDER — VALACYCLOVIR HYDROCHLORIDE 500 MG/1
500 TABLET, FILM COATED ORAL DAILY
Qty: 90 TABLET | Refills: 3 | Status: SHIPPED | OUTPATIENT
Start: 2020-10-12 | End: 2021-10-04

## 2020-12-09 DIAGNOSIS — E89.0 POSTABLATIVE HYPOTHYROIDISM: Primary | ICD-10-CM

## 2020-12-09 RX ORDER — LEVOTHYROXINE SODIUM 125 MCG
125 TABLET ORAL DAILY
Qty: 90 TABLET | Refills: 1 | Status: SHIPPED | OUTPATIENT
Start: 2020-12-09 | End: 2021-06-07

## 2021-03-03 ENCOUNTER — OFFICE VISIT (OUTPATIENT)
Dept: FAMILY MEDICINE CLINIC | Facility: CLINIC | Age: 61
End: 2021-03-03

## 2021-03-03 VITALS
OXYGEN SATURATION: 99 % | HEART RATE: 61 BPM | RESPIRATION RATE: 18 BRPM | HEIGHT: 61 IN | WEIGHT: 138.6 LBS | BODY MASS INDEX: 26.17 KG/M2 | DIASTOLIC BLOOD PRESSURE: 70 MMHG | TEMPERATURE: 98.2 F | SYSTOLIC BLOOD PRESSURE: 120 MMHG

## 2021-03-03 DIAGNOSIS — E89.0 POSTABLATIVE HYPOTHYROIDISM: Primary | Chronic | ICD-10-CM

## 2021-03-03 DIAGNOSIS — S60.152A CONTUSION OF LEFT LITTLE FINGER WITH DAMAGE TO NAIL, INITIAL ENCOUNTER: ICD-10-CM

## 2021-03-03 DIAGNOSIS — E78.5 HYPERLIPIDEMIA, UNSPECIFIED HYPERLIPIDEMIA TYPE: Chronic | ICD-10-CM

## 2021-03-03 DIAGNOSIS — K21.9 GASTROESOPHAGEAL REFLUX DISEASE WITHOUT ESOPHAGITIS: Chronic | ICD-10-CM

## 2021-03-03 PROCEDURE — 99214 OFFICE O/P EST MOD 30 MIN: CPT | Performed by: FAMILY MEDICINE

## 2021-03-03 NOTE — PROGRESS NOTES
"Chief Complaint  Hypothyroidism    Subjective          Jackie Frankel presents to [facility]  Patient here for routine thyroid and dyslipidemia checkup.  She is fasting for blood work.  She is compliant with her medications.  As far as her reflux, her famotidine still helps her symptoms.  She has been working really hard on diet and exercise this year and has lost 15 pounds.  She has not tried to wean her famotidine since then.  She has a couple questions about some new issues today.  Denuded skin from left 5th finger back during [place] reaching in her couch.  Still hurts if she bumps it on something.   It is funny feeling but not really numb.  She gets pain in the back of her thighs at times doing her yoga.  The instructor says she can bends her knees and just wonders if it will get better.  She is due for her second shingles vaccine but now qualifies for Covid and has questions about that.          Hypothyroidism           Objective   Vital Signs:   /70 (BP Location: Left arm, Patient Position: Sitting, Cuff Size: Adult)   Pulse 61   Temp 98.2 °F (36.8 °C) (Temporal)   Resp 18   Ht 154.9 cm (60.98\")   Wt 62.9 kg (138 lb 9.6 oz)   SpO2 99%   BMI 26.20 kg/m²     Physical Exam  Constitutional:       General: She is not in acute distress.     Appearance: Normal appearance. She is well-developed.   HENT:      Head: Normocephalic and atraumatic.      Right Ear: Tympanic membrane, ear canal and external ear normal.      Left Ear: Tympanic membrane, ear canal and external ear normal.      Mouth/Throat:      Mouth: Mucous membranes are moist.      Pharynx: Oropharynx is clear.   Eyes:      Conjunctiva/sclera: Conjunctivae normal.      Pupils: Pupils are equal, round, and reactive to light.   Neck:      Musculoskeletal: Neck supple.      Thyroid: No thyromegaly.   Cardiovascular:      Rate and Rhythm: Normal rate and regular rhythm.      Heart sounds: No murmur.   Pulmonary:    "   Effort: Pulmonary effort is normal.      Breath sounds: Normal breath sounds. No wheezing.   Abdominal:      General: Bowel sounds are normal.      Palpations: Abdomen is soft.      Tenderness: There is no abdominal tenderness.   Musculoskeletal: Normal range of motion.      Comments: Patient can bend over and touch the floor without bending her knees.   Lymphadenopathy:      Cervical: No cervical adenopathy.   Skin:     General: Skin is warm and dry.      Comments: Small scar ulnar side fifth finger at PIP, good cap refill, sensation grossly intact.   Neurological:      Mental Status: She is alert and oriented to person, place, and time.   Psychiatric:         Mood and Affect: Mood normal.         Behavior: Behavior normal.        Result Review :                 Assessment and Plan    Diagnoses and all orders for this visit:    1. Postablative hypothyroidism (Primary)  -     TSH    2. Hyperlipidemia, unspecified hyperlipidemia type  -     Lipid Panel  -     CBC & Differential  -     Comprehensive Metabolic Panel    3. Gastroesophageal reflux disease without esophagitis    4. Contusion of left little finger with damage to nail, initial encounter    Hypothyroidism-check TSH today.  Hyperlipidemia-we will check labs today.  GERD-continue medication.      Follow Up   Return in about 6 months (around 9/3/2021) for Annual physical.  Patient was given instructions and counseling regarding her condition or for health maintenance advice. Please see specific information pulled into the AVS if   appropriate.   It may be 6 months before nerve sensations in left fifth finger feel normal.  We can try generic levothyroxine again depending on what happens with your next refill.  Make sure to separate the Covid vaccine by at least 2 weeks from other vaccines.  Continue with your yoga on the flexibility of her hamstrings should improve.        Answers for HPI/ROS submitted by the patient on 2/24/2021   What is the primary reason  for your visit?: Other  Please describe your symptoms.: No symptoms - coming for regular 6 month appointment & for blood work for blood sugar, cholesterol and thyroid.  Have you had these symptoms before?: No  How long have you been having these symptoms?: 1-4 days

## 2021-03-03 NOTE — PATIENT INSTRUCTIONS
It may be 6 months before nerve sensations in left fifth finger feel normal.  We can try generic levothyroxine again depending on what happens with your next refill.  Make sure to separate the Covid vaccine by at least 2 weeks from other vaccines.  Continue with your yoga on the flexibility of her hamstrings should improve.

## 2021-03-04 LAB
ALBUMIN SERPL-MCNC: 4.3 G/DL (ref 3.5–5.2)
ALBUMIN/GLOB SERPL: 1.7 G/DL
ALP SERPL-CCNC: 76 U/L (ref 39–117)
ALT SERPL-CCNC: 16 U/L (ref 1–33)
AST SERPL-CCNC: 26 U/L (ref 1–32)
BASOPHILS # BLD AUTO: 0.05 10*3/MM3 (ref 0–0.2)
BASOPHILS NFR BLD AUTO: 0.9 % (ref 0–1.5)
BILIRUB SERPL-MCNC: 0.7 MG/DL (ref 0–1.2)
BUN SERPL-MCNC: 17 MG/DL (ref 8–23)
BUN/CREAT SERPL: 22.4 (ref 7–25)
CALCIUM SERPL-MCNC: 9.6 MG/DL (ref 8.6–10.5)
CHLORIDE SERPL-SCNC: 104 MMOL/L (ref 98–107)
CHOLEST SERPL-MCNC: 187 MG/DL (ref 0–200)
CO2 SERPL-SCNC: 27.1 MMOL/L (ref 22–29)
CREAT SERPL-MCNC: 0.76 MG/DL (ref 0.57–1)
EOSINOPHIL # BLD AUTO: 0.09 10*3/MM3 (ref 0–0.4)
EOSINOPHIL NFR BLD AUTO: 1.5 % (ref 0.3–6.2)
ERYTHROCYTE [DISTWIDTH] IN BLOOD BY AUTOMATED COUNT: 12 % (ref 12.3–15.4)
GLOBULIN SER CALC-MCNC: 2.6 GM/DL
GLUCOSE SERPL-MCNC: 88 MG/DL (ref 65–99)
HCT VFR BLD AUTO: 36.2 % (ref 34–46.6)
HDLC SERPL-MCNC: 64 MG/DL (ref 40–60)
HGB BLD-MCNC: 12 G/DL (ref 12–15.9)
IMM GRANULOCYTES # BLD AUTO: 0.01 10*3/MM3 (ref 0–0.05)
IMM GRANULOCYTES NFR BLD AUTO: 0.2 % (ref 0–0.5)
LDLC SERPL CALC-MCNC: 112 MG/DL (ref 0–100)
LYMPHOCYTES # BLD AUTO: 1.34 10*3/MM3 (ref 0.7–3.1)
LYMPHOCYTES NFR BLD AUTO: 22.9 % (ref 19.6–45.3)
MCH RBC QN AUTO: 31.3 PG (ref 26.6–33)
MCHC RBC AUTO-ENTMCNC: 33.1 G/DL (ref 31.5–35.7)
MCV RBC AUTO: 94.5 FL (ref 79–97)
MONOCYTES # BLD AUTO: 0.58 10*3/MM3 (ref 0.1–0.9)
MONOCYTES NFR BLD AUTO: 9.9 % (ref 5–12)
NEUTROPHILS # BLD AUTO: 3.78 10*3/MM3 (ref 1.7–7)
NEUTROPHILS NFR BLD AUTO: 64.6 % (ref 42.7–76)
NRBC BLD AUTO-RTO: 0 /100 WBC (ref 0–0.2)
PLATELET # BLD AUTO: 255 10*3/MM3 (ref 140–450)
POTASSIUM SERPL-SCNC: 4.6 MMOL/L (ref 3.5–5.2)
PROT SERPL-MCNC: 6.9 G/DL (ref 6–8.5)
RBC # BLD AUTO: 3.83 10*6/MM3 (ref 3.77–5.28)
SODIUM SERPL-SCNC: 140 MMOL/L (ref 136–145)
TRIGL SERPL-MCNC: 60 MG/DL (ref 0–150)
TSH SERPL DL<=0.005 MIU/L-ACNC: 0.05 UIU/ML (ref 0.27–4.2)
VLDLC SERPL CALC-MCNC: 11 MG/DL (ref 5–40)
WBC # BLD AUTO: 5.85 10*3/MM3 (ref 3.4–10.8)

## 2021-03-11 ENCOUNTER — TELEPHONE (OUTPATIENT)
Dept: FAMILY MEDICINE CLINIC | Facility: CLINIC | Age: 61
End: 2021-03-11

## 2021-03-11 NOTE — TELEPHONE ENCOUNTER
Caller: Frankel, Jackie    Relationship to patient: Self    Best call back number: [phone]    Patient is needing: PATIENT STATES THAT INSURANCE WILL NOT COVER SYNTHROID. PATIENT IS REQUESTING A CALL BACK TO DISCUSS WHAT NEEDS TO HAPPEN NEXT. PLEASE CALL BACK AND ADVISE

## 2021-03-12 ENCOUNTER — IMMUNIZATION (OUTPATIENT)
Dept: VACCINE CLINIC | Facility: HOSPITAL | Age: 61
End: 2021-03-12

## 2021-03-12 PROCEDURE — 0001A: CPT | Performed by: INTERNAL MEDICINE

## 2021-03-12 PROCEDURE — 91300 HC SARSCOV02 VAC 30MCG/0.3ML IM: CPT | Performed by: INTERNAL MEDICINE

## 2021-04-02 ENCOUNTER — IMMUNIZATION (OUTPATIENT)
Dept: VACCINE CLINIC | Facility: HOSPITAL | Age: 61
End: 2021-04-02

## 2021-04-02 PROCEDURE — 91300 HC SARSCOV02 VAC 30MCG/0.3ML IM: CPT | Performed by: INTERNAL MEDICINE

## 2021-04-02 PROCEDURE — 0002A: CPT | Performed by: INTERNAL MEDICINE

## 2021-04-16 ENCOUNTER — TELEPHONE (OUTPATIENT)
Dept: FAMILY MEDICINE CLINIC | Facility: CLINIC | Age: 61
End: 2021-04-16

## 2021-04-16 NOTE — TELEPHONE ENCOUNTER
Caller: Frankel, Jackie    Relationship to patient: Self    Best call back number: [phone]    Patient is needing: PATIENT TOOK DOUBLE DOSE Synthroid 125 MCG tablet. HAS BEEN IN CONTACT WITH [facility] [employer] AND WAS REFERRED BY THEM TO HER PCP. PLEASE CALL BACK AND ADVISE.

## 2021-04-16 NOTE — TELEPHONE ENCOUNTER
Just to skip tomorrow's dose and then get back on her regular schedule on Sunday.  Let me know if she has any chest pain palpitations or any other worrisome symptoms.

## 2021-05-12 ENCOUNTER — OFFICE VISIT (OUTPATIENT)
Dept: FAMILY MEDICINE CLINIC | Facility: CLINIC | Age: 61
End: 2021-05-12

## 2021-05-12 VITALS
OXYGEN SATURATION: 99 % | WEIGHT: 137.2 LBS | BODY MASS INDEX: 25.9 KG/M2 | DIASTOLIC BLOOD PRESSURE: 62 MMHG | TEMPERATURE: 96.4 F | HEIGHT: 61 IN | HEART RATE: 79 BPM | SYSTOLIC BLOOD PRESSURE: 116 MMHG

## 2021-05-12 DIAGNOSIS — R30.0 DYSURIA: Primary | ICD-10-CM

## 2021-05-12 PROCEDURE — 81003 URINALYSIS AUTO W/O SCOPE: CPT | Performed by: FAMILY MEDICINE

## 2021-05-12 PROCEDURE — 99213 OFFICE O/P EST LOW 20 MIN: CPT | Performed by: FAMILY MEDICINE

## 2021-05-12 NOTE — PROGRESS NOTES
"Chief Complaint  Urinary Tract Infection    Subjective          Jackie Frankel presents to [facility]  Has had some stinging in her vulvar area for the past few days.  Had diarrhea for 2 days a couple weeks ago.  No fever, chills, nausea or vomiting.  She denies any vaginal discharge.        Objective   Vital Signs:   /62   Pulse 79   Temp 96.4 °F (35.8 °C)   Ht 154.9 cm (61\")   Wt 62.2 kg (137 lb 3.2 oz)   SpO2 99%   BMI 25.92 kg/m²     Physical Exam  Constitutional:       General: She is not in acute distress.     Appearance: Normal appearance. She is well-developed.   HENT:      Head: Normocephalic and atraumatic.      Right Ear: Tympanic membrane, ear canal and external ear normal.      Left Ear: Tympanic membrane, ear canal and external ear normal.      Mouth/Throat:      Mouth: Mucous membranes are moist.      Pharynx: Oropharynx is clear.   Eyes:      Conjunctiva/sclera: Conjunctivae normal.      Pupils: Pupils are equal, round, and reactive to light.   Neck:      Thyroid: No thyromegaly.   Cardiovascular:      Rate and Rhythm: Normal rate and regular rhythm.      Heart sounds: No murmur heard.     Pulmonary:      Effort: Pulmonary effort is normal.      Breath sounds: Normal breath sounds. No wheezing.   Abdominal:      General: Bowel sounds are normal.      Palpations: Abdomen is soft.      Tenderness: There is no abdominal tenderness.   Musculoskeletal:         General: Normal range of motion.      Cervical back: Neck supple.   Lymphadenopathy:      Cervical: No cervical adenopathy.   Skin:     General: Skin is warm and dry.   Neurological:      Mental Status: She is alert and oriented to person, place, and time.   Psychiatric:         Mood and Affect: Mood normal.         Behavior: Behavior normal.        Result Review :                 Assessment and Plan    Diagnoses and all orders for this visit:    1. Dysuria (Primary)  -     POC Urinalysis Dipstick, " Automated  -     Urine Culture - Urine, Urine, Clean Catch    Dysuria-could be from vulvar irritation after the recent gastroenteritis, follow-up if needed    Follow Up   No follow-ups on file.  Patient was given instructions and counseling regarding her condition or for health maintenance advice. Please see specific information pulled into the AVS if appropriate.       Answers for HPI/ROS submitted by the patient on 5/12/2021  Please describe your symptoms.: Stinging after urinating & lower back soreness  Have you had these symptoms before?: Yes  How long have you been having these symptoms?: 1-4 days  Please list any medications you are currently taking for this condition.: None  Please describe any probable cause for these symptoms. : Either a bladder infection or I’ve accidentally scratched myself & the lower back soreness is unrelated to the stinging.  What is the primary reason for your visit?: Other

## 2021-05-14 LAB
BACTERIA UR CULT: NORMAL
BACTERIA UR CULT: NORMAL

## 2021-06-07 DIAGNOSIS — E89.0 POSTABLATIVE HYPOTHYROIDISM: ICD-10-CM

## 2021-06-07 RX ORDER — FAMOTIDINE 20 MG/1
TABLET, FILM COATED ORAL
Qty: 60 TABLET | Refills: 2 | Status: SHIPPED | OUTPATIENT
Start: 2021-06-07 | End: 2021-06-24

## 2021-06-07 RX ORDER — LEVOTHYROXINE SODIUM 125 MCG
TABLET ORAL
Qty: 90 TABLET | Refills: 0 | Status: SHIPPED | OUTPATIENT
Start: 2021-06-07 | End: 2021-09-07

## 2021-06-09 ENCOUNTER — TELEPHONE (OUTPATIENT)
Dept: FAMILY MEDICINE CLINIC | Facility: CLINIC | Age: 61
End: 2021-06-09

## 2021-06-09 NOTE — TELEPHONE ENCOUNTER
Caller: Frankel, Jackie    Relationship: Self    Best call back number:[phone]    What is the best time to reach you: THIS AFTERNOON    Who are you requesting to speak with (clinical staff, provider,  specific staff member): CLINICAL STAFF    Do you require a callback: YES. PATIENT HAD A STOMACH VIRUS IN April AND SINCE THEN SHE HAS HAD LOOSE STOOL ON AND OFF ON WEEKLY AND SHE IS TAKING IMODIUM .  PATIENT'S INTESTINAL TRACK IS VERY LOUD.  PLEASE ADVISE PATIENT IS SHE NEEDS TO BE SEEN OR CAN SOMETHING BE SENT TO HER PHARMACY [facility].

## 2021-06-10 ENCOUNTER — OFFICE VISIT (OUTPATIENT)
Dept: FAMILY MEDICINE CLINIC | Facility: CLINIC | Age: 61
End: 2021-06-10

## 2021-06-10 VITALS
BODY MASS INDEX: 25.98 KG/M2 | DIASTOLIC BLOOD PRESSURE: 78 MMHG | TEMPERATURE: 98.9 F | OXYGEN SATURATION: 99 % | WEIGHT: 137.6 LBS | HEIGHT: 61 IN | HEART RATE: 81 BPM | SYSTOLIC BLOOD PRESSURE: 132 MMHG

## 2021-06-10 DIAGNOSIS — E89.0 POSTABLATIVE HYPOTHYROIDISM: Primary | ICD-10-CM

## 2021-06-10 DIAGNOSIS — R19.7 DIARRHEA, UNSPECIFIED TYPE: ICD-10-CM

## 2021-06-10 PROCEDURE — 99213 OFFICE O/P EST LOW 20 MIN: CPT | Performed by: NURSE PRACTITIONER

## 2021-06-10 NOTE — PROGRESS NOTES
"Chief Complaint  Diarrhea (last 2 months )    Subjective          Jackie Frankel presents to [facility]  History of Present Illness     4/25 diarrhea and vomiting GI virus or food poisoning  Vomiting stopped that day. Had to take imodium and then it stopped. Had low grade fever and feeling bad  5/22 had loose stool normal in between  6/2 had loose stool.  imodium and then went back to normal   6/8 had am and pm and then several times yesterday morning.  Took Imodium and then called us.    All other times didn't feel bad at all.  Has been more gassy than normal last couple months.       Has been on Famotidine for last 3 months.  Last check of thyroid was slightly hyperactive.     Stopped magnesium and has slightly decreased fiber intake.      Objective   Vital Signs:   /78   Pulse 81   Temp 98.9 °F (37.2 °C)   Ht 154.9 cm (61\")   Wt 62.4 kg (137 lb 9.6 oz)   SpO2 99%   BMI 26.00 kg/m²     Physical Exam  Constitutional:       Appearance: Normal appearance.   Cardiovascular:      Rate and Rhythm: Normal rate and regular rhythm.      Pulses: Normal pulses.   Pulmonary:      Effort: Pulmonary effort is normal.      Breath sounds: Normal breath sounds.   Neurological:      Mental Status: She is alert and oriented to person, place, and time.   Psychiatric:         Mood and Affect: Mood normal.         Behavior: Behavior normal.         Thought Content: Thought content normal.         Judgment: Judgment normal.        Result Review :                 Assessment and Plan    Diagnoses and all orders for this visit:    1. Postablative hypothyroidism (Primary)  -     CBC & Differential  -     Comprehensive Metabolic Panel  -     TSH  -     Magnesium  -     Vitamin B12  -     Vitamin D 25 Hydroxy  -     T3, free  -     T4, free    2. Diarrhea, unspecified type  -     CBC & Differential  -     Comprehensive Metabolic Panel  -     TSH  -     Magnesium  -     Vitamin B12  -     Vitamin D 25 " Hydroxy  -     T3, free  -     T4, free      Discussed with patient I would like to recheck thyroid. Diarrhea could be related to hyperthyroid state.  Also want to check electrolytes.  If this comes back normal I suggested we change famotidine to another medication for heartburn. She verbalizes understanding.  Follow up as needed.        Follow Up   No follow-ups on file.  Patient was given instructions and counseling regarding her condition or for health maintenance advice. Please see specific information pulled into the AVS if appropriate.

## 2021-06-11 LAB
25(OH)D3+25(OH)D2 SERPL-MCNC: 39.7 NG/ML (ref 30–100)
ALBUMIN SERPL-MCNC: 4.3 G/DL (ref 3.8–4.8)
ALBUMIN/GLOB SERPL: 1.9 {RATIO} (ref 1.2–2.2)
ALP SERPL-CCNC: 73 IU/L (ref 48–121)
ALT SERPL-CCNC: 26 IU/L (ref 0–32)
AST SERPL-CCNC: 30 IU/L (ref 0–40)
BASOPHILS # BLD AUTO: 0.1 X10E3/UL (ref 0–0.2)
BASOPHILS NFR BLD AUTO: 1 %
BILIRUB SERPL-MCNC: 0.4 MG/DL (ref 0–1.2)
BUN SERPL-MCNC: 19 MG/DL (ref 8–27)
BUN/CREAT SERPL: 20 (ref 12–28)
CALCIUM SERPL-MCNC: 9.4 MG/DL (ref 8.7–10.3)
CHLORIDE SERPL-SCNC: 101 MMOL/L (ref 96–106)
CO2 SERPL-SCNC: 23 MMOL/L (ref 20–29)
CREAT SERPL-MCNC: 0.95 MG/DL (ref 0.57–1)
EOSINOPHIL # BLD AUTO: 0.1 X10E3/UL (ref 0–0.4)
EOSINOPHIL NFR BLD AUTO: 1 %
ERYTHROCYTE [DISTWIDTH] IN BLOOD BY AUTOMATED COUNT: 12.6 % (ref 11.7–15.4)
GLOBULIN SER CALC-MCNC: 2.3 G/DL (ref 1.5–4.5)
GLUCOSE SERPL-MCNC: 91 MG/DL (ref 65–99)
HCT VFR BLD AUTO: 35.6 % (ref 34–46.6)
HGB BLD-MCNC: 11.6 G/DL (ref 11.1–15.9)
IMM GRANULOCYTES # BLD AUTO: 0 X10E3/UL (ref 0–0.1)
IMM GRANULOCYTES NFR BLD AUTO: 0 %
LYMPHOCYTES # BLD AUTO: 1.6 X10E3/UL (ref 0.7–3.1)
LYMPHOCYTES NFR BLD AUTO: 27 %
MAGNESIUM SERPL-MCNC: 2.1 MG/DL (ref 1.6–2.3)
MCH RBC QN AUTO: 30.9 PG (ref 26.6–33)
MCHC RBC AUTO-ENTMCNC: 32.6 G/DL (ref 31.5–35.7)
MCV RBC AUTO: 95 FL (ref 79–97)
MONOCYTES # BLD AUTO: 0.5 X10E3/UL (ref 0.1–0.9)
MONOCYTES NFR BLD AUTO: 9 %
NEUTROPHILS # BLD AUTO: 3.6 X10E3/UL (ref 1.4–7)
NEUTROPHILS NFR BLD AUTO: 62 %
PLATELET # BLD AUTO: 306 X10E3/UL (ref 150–450)
POTASSIUM SERPL-SCNC: 4.8 MMOL/L (ref 3.5–5.2)
PROT SERPL-MCNC: 6.6 G/DL (ref 6–8.5)
RBC # BLD AUTO: 3.76 X10E6/UL (ref 3.77–5.28)
SODIUM SERPL-SCNC: 137 MMOL/L (ref 134–144)
T3FREE SERPL-MCNC: 2.1 PG/ML (ref 2–4.4)
T4 FREE SERPL-MCNC: 1.61 NG/DL (ref 0.82–1.77)
TSH SERPL DL<=0.005 MIU/L-ACNC: 0.45 UIU/ML (ref 0.45–4.5)
VIT B12 SERPL-MCNC: 1217 PG/ML (ref 232–1245)
WBC # BLD AUTO: 5.9 X10E3/UL (ref 3.4–10.8)

## 2021-06-24 ENCOUNTER — TELEPHONE (OUTPATIENT)
Dept: FAMILY MEDICINE CLINIC | Facility: CLINIC | Age: 61
End: 2021-06-24

## 2021-06-24 RX ORDER — PANTOPRAZOLE SODIUM 40 MG/1
40 TABLET, DELAYED RELEASE ORAL DAILY
Qty: 30 TABLET | Refills: 2 | Status: SHIPPED | OUTPATIENT
Start: 2021-06-24 | End: 2021-09-13

## 2021-06-24 NOTE — TELEPHONE ENCOUNTER
CALLED PT SHE IS STILL HAVING BAD DIARRHEA SHE SAID YOU HAD MENTIONED CHANGING HER STOMACH  MEDS WHEN SHE SAW YOU LAST

## 2021-06-24 NOTE — TELEPHONE ENCOUNTER
Caller: Frankel, Jackie    Relationship: Self    Best call back number:[phone]    What is the best time to reach you:ASAP  Who are you requesting to speak with (clinical staff, provider,  specific staff member): FRIDA BRADLEY    Do you know the name of the person who called: BENJAMIN    What was the call regarding: SAW HER 2 WEEKS AGO AND WANTS TO UPDATE HER ON DIARRHEA.    Do you require a callback: YES

## 2021-07-06 DIAGNOSIS — L30.9 ECZEMA, UNSPECIFIED TYPE: ICD-10-CM

## 2021-07-06 RX ORDER — TRIAMCINOLONE ACETONIDE 1 MG/G
CREAM TOPICAL 2 TIMES DAILY
Qty: 80 G | Refills: 2 | Status: SHIPPED | OUTPATIENT
Start: 2021-07-06

## 2021-07-06 NOTE — TELEPHONE ENCOUNTER
Caller: Frankel, Jackie    Relationship: Self    Best call back number: [phone]     Medication needed:   Requested Prescriptions     Pending Prescriptions Disp Refills   • triamcinolone (KENALOG) 0.1 % cream 80 g 2     Sig: Apply  topically to the appropriate area as directed 2 (Two) Times a Day.       When do you need the refill by: 21    What additional details did the patient provide when requesting the medication: PATIENT IS OUT OF REFILLS THE PRESCRIPTIONS HAS  SHE IS ALMOST OUT    Does the patient have less than a 3 day supply:  [x] Yes  [] No    What is the patient's preferred pharmacy: [address] - [phone] [facility] [phone] FX

## 2021-09-07 DIAGNOSIS — E89.0 POSTABLATIVE HYPOTHYROIDISM: ICD-10-CM

## 2021-09-07 RX ORDER — LEVOTHYROXINE SODIUM 125 MCG
TABLET ORAL
Qty: 90 TABLET | Refills: 0 | Status: SHIPPED | OUTPATIENT
Start: 2021-09-07 | End: 2021-10-06 | Stop reason: SDUPTHER

## 2021-09-13 RX ORDER — IBUPROFEN 800 MG/1
TABLET ORAL
Qty: 90 TABLET | Refills: 0 | Status: SHIPPED | OUTPATIENT
Start: 2021-09-13 | End: 2021-12-13

## 2021-09-13 RX ORDER — PANTOPRAZOLE SODIUM 40 MG/1
TABLET, DELAYED RELEASE ORAL
Qty: 30 TABLET | Refills: 2 | Status: SHIPPED | OUTPATIENT
Start: 2021-09-13 | End: 2021-10-06 | Stop reason: SDUPTHER

## 2021-10-04 DIAGNOSIS — B00.9 HSV INFECTION: ICD-10-CM

## 2021-10-04 RX ORDER — VALACYCLOVIR HYDROCHLORIDE 500 MG/1
TABLET, FILM COATED ORAL
Qty: 90 TABLET | Refills: 3 | Status: SHIPPED | OUTPATIENT
Start: 2021-10-04 | End: 2022-09-26

## 2021-10-06 ENCOUNTER — OFFICE VISIT (OUTPATIENT)
Dept: FAMILY MEDICINE CLINIC | Facility: CLINIC | Age: 61
End: 2021-10-06

## 2021-10-06 VITALS
HEIGHT: 61 IN | SYSTOLIC BLOOD PRESSURE: 128 MMHG | WEIGHT: 144.2 LBS | DIASTOLIC BLOOD PRESSURE: 76 MMHG | BODY MASS INDEX: 27.23 KG/M2 | HEART RATE: 81 BPM | OXYGEN SATURATION: 97 % | TEMPERATURE: 98.2 F

## 2021-10-06 DIAGNOSIS — Z23 NEED FOR INFLUENZA VACCINATION: ICD-10-CM

## 2021-10-06 DIAGNOSIS — K21.9 GASTROESOPHAGEAL REFLUX DISEASE WITHOUT ESOPHAGITIS: ICD-10-CM

## 2021-10-06 DIAGNOSIS — Z12.4 SCREENING FOR CERVICAL CANCER: ICD-10-CM

## 2021-10-06 DIAGNOSIS — Z23 NEED FOR TDAP VACCINATION: ICD-10-CM

## 2021-10-06 DIAGNOSIS — L72.3 SEBACEOUS CYST: ICD-10-CM

## 2021-10-06 DIAGNOSIS — E78.5 HYPERLIPIDEMIA, UNSPECIFIED HYPERLIPIDEMIA TYPE: ICD-10-CM

## 2021-10-06 DIAGNOSIS — B00.9 HSV INFECTION: ICD-10-CM

## 2021-10-06 DIAGNOSIS — Z00.00 ROUTINE HEALTH MAINTENANCE: Primary | ICD-10-CM

## 2021-10-06 DIAGNOSIS — E89.0 POSTABLATIVE HYPOTHYROIDISM: ICD-10-CM

## 2021-10-06 DIAGNOSIS — L30.9 ECZEMA, UNSPECIFIED TYPE: ICD-10-CM

## 2021-10-06 PROCEDURE — 90472 IMMUNIZATION ADMIN EACH ADD: CPT | Performed by: FAMILY MEDICINE

## 2021-10-06 PROCEDURE — 90471 IMMUNIZATION ADMIN: CPT | Performed by: FAMILY MEDICINE

## 2021-10-06 PROCEDURE — 90715 TDAP VACCINE 7 YRS/> IM: CPT | Performed by: FAMILY MEDICINE

## 2021-10-06 PROCEDURE — 90686 IIV4 VACC NO PRSV 0.5 ML IM: CPT | Performed by: FAMILY MEDICINE

## 2021-10-06 PROCEDURE — 99396 PREV VISIT EST AGE 40-64: CPT | Performed by: FAMILY MEDICINE

## 2021-10-06 RX ORDER — LEVOTHYROXINE SODIUM 125 MCG
125 TABLET ORAL DAILY
Qty: 90 TABLET | Refills: 3 | Status: SHIPPED | OUTPATIENT
Start: 2021-10-06 | End: 2022-04-07 | Stop reason: DRUGHIGH

## 2021-10-06 RX ORDER — PANTOPRAZOLE SODIUM 40 MG/1
40 TABLET, DELAYED RELEASE ORAL DAILY
Qty: 90 TABLET | Refills: 3 | Status: SHIPPED | OUTPATIENT
Start: 2021-10-06 | End: 2022-04-06 | Stop reason: SDUPTHER

## 2021-10-06 NOTE — PROGRESS NOTES
Subjective   Jackie Frankel is a 61 y.o. female who presents for annual female wellness exam.  Chief Complaint   Patient presents with   • Hypothyroidism   • Hyperlipidemia   • Heartburn   • Gynecologic Exam     pap     Patient presents for her physical.  She is doing well in general without any complaints.  She does have a spot on her bottom she wants me to look at.  It has flared up and drained a couple times in the past.  She is always been able to take care of it and has not had to seek treatment for it.  She is compliant with her medications for her reflux hypothyroidism without complaint.       Menstrual History: Menopause in her early 50s  Pregnancy History: G0  Sexual History: with    Contraception: na  Hormone Replacement Therapy: na  Diet: standard  Exercise: some  Do you feel safe? yes  Have you ever been abused? No  Last dental appointment up to date  Last eye exam up to date    Mammogram: 10/3/2020  Pap Smear: 2018 neg cotest  Bone Density: 20 osteopenia  Colon Cancer Screenin2019    Immunization History   Administered Date(s) Administered   • COVID-19 (PFIZER) 2021, 2021   • Flu Vaccine Intradermal Quad 18-64YR 10/17/2016, 2017, 10/11/2018   • FluLaval/Fluarix >6 Months 2020, 10/06/2021   • Flublock Quad =>18yrs 10/03/2019   • Shingrix 2021, 2021   • Tdap 10/06/2021       The following portions of the patient's history were reviewed and updated as appropriate: allergies, current medications, past family history, past medical history, past social history, past surgical history and problem list.    Past Medical History:   Diagnosis Date   • Abnormal liver function test    • Abnormal Pap smear of vagina     Abnormal Pap smear of vagina and vaginal HPV   • Allergic 1966   • Anxiety disorder    • Arthralgia of multiple sites    • BMI 27.0-27.9,adult    • Callus of foot    • Cataract    • Cervicalgia    • Colon polyps    • Eczema    • Edema  extremities    • Encounter for gynecological examination without abnormal finding    • Encounter for long-term (current) use of medications    • Encounter for screening colonoscopy    • Encounter for screening for lipoid disorders    • Encounter for screening for malignant neoplasm of colon    • Generalized osteoarthritis of hand    • GERD (gastroesophageal reflux disease)    • HSV infection    • Hypothyroidism    • Left knee pain    • Menopausal symptoms    • Neck pain    • Neoplasm of uncertain behavior of skin    • Otalgia of right ear    • Pain in left shoulder    • Sleep disturbance    • Sore throat    • Strep pharyngitis    • Thyroid disease    • Viral warts    • Visit for screening mammogram        Past Surgical History:   Procedure Laterality Date   • COLONOSCOPY      Due to BRBPR 2002, 2014   • DILATATION AND CURETTAGE  2006   • EYE SURGERY         Family History   Problem Relation Age of Onset   • Arthritis Mother    • Dementia Mother    • Uterine cancer Mother    • Cancer Mother    • Thyroid disease Mother    • Cirrhosis Father    • Liver disease Father    • Hyperlipidemia Paternal Grandmother        Social History     Socioeconomic History   • Marital status:      Spouse name: Not on file   • Number of children: Not on file   • Years of education: Not on file   • Highest education level: Not on file   Tobacco Use   • Smoking status: Never Smoker   • Smokeless tobacco: Never Used   Substance and Sexual Activity   • Alcohol use: Not Currently     Alcohol/week: 0.0 standard drinks     Comment: drinks about 5 times per year   • Drug use: No   • Sexual activity: Yes     Partners: Male     Birth control/protection: Post-menopausal         Current Outpatient Medications:   •  ibuprofen (ADVIL,MOTRIN) 800 MG tablet, TAKE ONE TABLET BY MOUTH EVERY 6 HOURS AS NEEDED FOR MILD PAIN, Disp: 90 tablet, Rfl: 0  •  pantoprazole (PROTONIX) 40 MG EC tablet, Take 1 tablet by mouth Daily., Disp: 90 tablet, Rfl: 3  •   "Synthroid 125 MCG tablet, Take 1 tablet by mouth Daily., Disp: 90 tablet, Rfl: 3  •  triamcinolone (KENALOG) 0.1 % cream, Apply  topically to the appropriate area as directed 2 (Two) Times a Day., Disp: 80 g, Rfl: 2  •  valACYclovir (VALTREX) 500 MG tablet, TAKE ONE TABLET BY MOUTH DAILY, Disp: 90 tablet, Rfl: 3    Review of Systems    Objective   Vitals:    10/06/21 1305   BP: 128/76   Pulse: 81   Temp: 98.2 °F (36.8 °C)   SpO2: 97%   Weight: 65.4 kg (144 lb 3.2 oz)   Height: 154.9 cm (61\")     Body mass index is 27.25 kg/m².  Physical Exam  Vitals and nursing note reviewed.   Constitutional:       General: She is not in acute distress.     Appearance: Normal appearance. She is well-developed.   HENT:      Head: Normocephalic and atraumatic.      Right Ear: Tympanic membrane, ear canal and external ear normal.      Left Ear: Tympanic membrane, ear canal and external ear normal.      Nose: Nose normal.      Mouth/Throat:      Mouth: Mucous membranes are moist.      Pharynx: Oropharynx is clear. No oropharyngeal exudate or posterior oropharyngeal erythema.   Eyes:      Conjunctiva/sclera: Conjunctivae normal.      Pupils: Pupils are equal, round, and reactive to light.   Neck:      Thyroid: No thyromegaly.   Cardiovascular:      Rate and Rhythm: Normal rate and regular rhythm.      Heart sounds: No murmur heard.     Pulmonary:      Effort: Pulmonary effort is normal.      Breath sounds: Normal breath sounds. No wheezing.   Abdominal:      General: Abdomen is flat. Bowel sounds are normal. There is no distension.      Palpations: Abdomen is soft. There is no mass.      Tenderness: There is no abdominal tenderness.      Hernia: No hernia is present.   Genitourinary:     General: Normal vulva.      Exam position: Lithotomy position.      Vagina: Normal.      Cervix: Normal.      Adnexa: Right adnexa normal and left adnexa normal.   Musculoskeletal:         General: No swelling. Normal range of motion.      Cervical " back: Normal range of motion and neck supple.      Right lower leg: No edema.      Left lower leg: No edema.   Lymphadenopathy:      Cervical: No cervical adenopathy.   Skin:     General: Skin is warm and dry.      Capillary Refill: Capillary refill takes less than 2 seconds.      Findings: No rash.      Comments: Nodule inferior and left of coccyx that may be a sebaceous cyst, it is distant from the anus.  No tenderness or inflammation today.   Neurological:      General: No focal deficit present.      Mental Status: She is alert and oriented to person, place, and time.      Cranial Nerves: No cranial nerve deficit.   Psychiatric:         Mood and Affect: Mood normal.         Behavior: Behavior normal.           Assessment/Plan   Diagnoses and all orders for this visit:    1. Routine health maintenance (Primary)    2. Postablative hypothyroidism  -     Synthroid 125 MCG tablet; Take 1 tablet by mouth Daily.  Dispense: 90 tablet; Refill: 3    3. Hyperlipidemia, unspecified hyperlipidemia type    4. Gastroesophageal reflux disease without esophagitis  -     pantoprazole (PROTONIX) 40 MG EC tablet; Take 1 tablet by mouth Daily.  Dispense: 90 tablet; Refill: 3    5. HSV infection    6. Eczema, unspecified type    7. Screening for cervical cancer  -     IGP, Aptima HPV, Rfx 16 / 18,45    8. Need for influenza vaccination  -     FluLaval/Fluarix >6 Months (4730-8211)    9. Need for Tdap vaccination  -     Tdap Vaccine Greater Than or Equal To 8yo IM  -     TSH    10. Sebaceous cyst    Hypothyroidism-due for TSH today  GERD refill pantoprazole  We will get Pap screening for cervical cancer today  Possible sebaceous cyst-if this area swells up again, let me know and we can set up a surgery referral.    Routine follow up in 6 months,            Discussed the importance of maintaining a healthy weight and getting regular exercise.  Educated patient on the benefits of healthy diet.  Advise follow-up annually for wellness  exams.    There are no Patient Instructions on file for this visit.

## 2021-10-07 LAB — TSH SERPL DL<=0.005 MIU/L-ACNC: 0.43 UIU/ML (ref 0.45–4.5)

## 2021-10-09 LAB
CYTOLOGIST CVX/VAG CYTO: NORMAL
CYTOLOGY CVX/VAG DOC CYTO: NORMAL
CYTOLOGY CVX/VAG DOC THIN PREP: NORMAL
DX ICD CODE: NORMAL
HIV 1 & 2 AB SER-IMP: NORMAL
HPV I/H RISK 4 DNA CVX QL PROBE+SIG AMP: NORMAL
OTHER STN SPEC: NORMAL
REQUEST PROBLEM: NORMAL
STAT OF ADQ CVX/VAG CYTO-IMP: NORMAL

## 2021-12-13 RX ORDER — IBUPROFEN 800 MG/1
TABLET ORAL
Qty: 90 TABLET | Refills: 0 | Status: SHIPPED | OUTPATIENT
Start: 2021-12-13 | End: 2022-03-14

## 2022-03-14 RX ORDER — IBUPROFEN 800 MG/1
TABLET ORAL
Qty: 90 TABLET | Refills: 0 | Status: SHIPPED | OUTPATIENT
Start: 2022-03-14 | End: 2022-06-13

## 2022-04-06 ENCOUNTER — OFFICE VISIT (OUTPATIENT)
Dept: FAMILY MEDICINE CLINIC | Facility: CLINIC | Age: 62
End: 2022-04-06

## 2022-04-06 VITALS
SYSTOLIC BLOOD PRESSURE: 128 MMHG | TEMPERATURE: 97.5 F | DIASTOLIC BLOOD PRESSURE: 70 MMHG | OXYGEN SATURATION: 98 % | HEART RATE: 76 BPM | WEIGHT: 148.2 LBS | BODY MASS INDEX: 27.98 KG/M2 | HEIGHT: 61 IN

## 2022-04-06 DIAGNOSIS — K21.9 GASTROESOPHAGEAL REFLUX DISEASE WITHOUT ESOPHAGITIS: ICD-10-CM

## 2022-04-06 DIAGNOSIS — E78.5 HYPERLIPIDEMIA, UNSPECIFIED HYPERLIPIDEMIA TYPE: ICD-10-CM

## 2022-04-06 DIAGNOSIS — E89.0 POSTABLATIVE HYPOTHYROIDISM: Primary | ICD-10-CM

## 2022-04-06 PROCEDURE — 99213 OFFICE O/P EST LOW 20 MIN: CPT | Performed by: FAMILY MEDICINE

## 2022-04-06 RX ORDER — PANTOPRAZOLE SODIUM 40 MG/1
40 TABLET, DELAYED RELEASE ORAL 2 TIMES DAILY
Qty: 180 TABLET | Refills: 3 | Status: SHIPPED | OUTPATIENT
Start: 2022-04-06

## 2022-04-06 NOTE — PROGRESS NOTES
"Chief Complaint  Hypothyroidism    Subjective          Jackie Frankel presents to [facility]  History of Present Illness  GERD  The patient states she takes pantoprazole and notes this medication is not as good as other medications she has tried in the past. She complains of an intermittent stomach \"tightness\". Audelia states her symptoms mostly begin at nighttime.     Hypothyroidism   The patient denies any thyroid symptoms or any herpes outbreak.     Review of systems:  A review of systems has been completed and positive findings are noted in the HPI.     Objective   Vital Signs:   /70   Pulse 76   Temp 97.5 °F (36.4 °C)   Ht 154.9 cm (61\")   Wt 67.2 kg (148 lb 3.2 oz)   SpO2 98%   BMI 28.00 kg/m²            Physical Exam  Constitutional:       General: She is not in acute distress.     Appearance: Normal appearance. She is well-developed.   HENT:      Head: Normocephalic and atraumatic.      Right Ear: Tympanic membrane, ear canal and external ear normal.      Left Ear: Tympanic membrane, ear canal and external ear normal.      Mouth/Throat:      Mouth: Mucous membranes are moist.      Pharynx: Oropharynx is clear.   Eyes:      Conjunctiva/sclera: Conjunctivae normal.      Pupils: Pupils are equal, round, and reactive to light.   Neck:      Thyroid: No thyromegaly.   Cardiovascular:      Rate and Rhythm: Normal rate and regular rhythm.      Heart sounds: No murmur heard.  Pulmonary:      Effort: Pulmonary effort is normal.      Breath sounds: Normal breath sounds. No wheezing.   Abdominal:      General: Bowel sounds are normal.      Palpations: Abdomen is soft.      Tenderness: There is no abdominal tenderness.   Musculoskeletal:         General: Normal range of motion.      Cervical back: Neck supple.   Lymphadenopathy:      Cervical: No cervical adenopathy.   Skin:     General: Skin is warm and dry.   Neurological:      Mental Status: She is alert and oriented to " person, place, and time.   Psychiatric:         Mood and Affect: Mood normal.         Behavior: Behavior normal.        Result Review :                 Assessment and Plan    Diagnoses and all orders for this visit:    1. Postablative hypothyroidism (Primary)  -     TSH    2. Gastroesophageal reflux disease without esophagitis  -     pantoprazole (PROTONIX) 40 MG EC tablet; Take 1 tablet by mouth 2 (Two) Times a Day.  Dispense: 180 tablet; Refill: 3    3. Hyperlipidemia, unspecified hyperlipidemia type  -     Lipid Panel  -     Comprehensive Metabolic Panel    1. Hypothyroidism   • Continue current medication regimen. She does not need refills today. Thyroid lab work has been ordered and the patient will obtain.     2. GERD  • The patient will trial omeprazole 40 mg twice per day, if she does well with that, then we will trying 20 mg twice per day.     3. Hyperlipidemia   • Cholesterol lab work has been ordered and the patient will obtain.     Follow Up   Return in about 6 months (around 10/6/2022) for Annual physical.  Patient was given instructions and counseling regarding her condition or for health maintenance advice. Please see specific information pulled into the AVS if appropriate.       Transcribed from ambient dictation for Meredith Lea Kehrer, MD by PRIYA CRUZ.  04/06/22   08:37 EDT    Patient verbalized consent to the visit recording.  I have personally performed the services described in this document as transcribed by the above individual, and it is both accurate and complete.  Meredith Lea Kehrer, MD  4/7/2022  12:18 EDT    Answers for HPI/ROS submitted by the patient on 3/30/2022  Please describe your symptoms.: No symptoms - regular every 6 months appointment to check on thyroid condition.  Have you had these symptoms before?: Yes  How long have you been having these symptoms?: Greater than 2 weeks  Please list any medications you are currently taking for this condition.: Synthroid.  What is  the primary reason for your visit?: Other

## 2022-04-07 DIAGNOSIS — E89.0 POSTABLATIVE HYPOTHYROIDISM: Primary | ICD-10-CM

## 2022-04-07 LAB
ALBUMIN SERPL-MCNC: 4.1 G/DL (ref 3.8–4.8)
ALBUMIN/GLOB SERPL: 2 {RATIO} (ref 1.2–2.2)
ALP SERPL-CCNC: 66 IU/L (ref 44–121)
ALT SERPL-CCNC: 22 IU/L (ref 0–32)
AST SERPL-CCNC: 24 IU/L (ref 0–40)
BILIRUB SERPL-MCNC: 0.3 MG/DL (ref 0–1.2)
BUN SERPL-MCNC: 21 MG/DL (ref 8–27)
BUN/CREAT SERPL: 25 (ref 12–28)
CALCIUM SERPL-MCNC: 9.3 MG/DL (ref 8.7–10.3)
CHLORIDE SERPL-SCNC: 105 MMOL/L (ref 96–106)
CHOLEST SERPL-MCNC: 183 MG/DL (ref 100–199)
CO2 SERPL-SCNC: 23 MMOL/L (ref 20–29)
CREAT SERPL-MCNC: 0.85 MG/DL (ref 0.57–1)
EGFRCR SERPLBLD CKD-EPI 2021: 76 ML/MIN/1.73
GLOBULIN SER CALC-MCNC: 2.1 G/DL (ref 1.5–4.5)
GLUCOSE SERPL-MCNC: 82 MG/DL (ref 65–99)
HDLC SERPL-MCNC: 64 MG/DL
LDLC SERPL CALC-MCNC: 109 MG/DL (ref 0–99)
POTASSIUM SERPL-SCNC: 4.9 MMOL/L (ref 3.5–5.2)
PROT SERPL-MCNC: 6.2 G/DL (ref 6–8.5)
SODIUM SERPL-SCNC: 140 MMOL/L (ref 134–144)
TRIGL SERPL-MCNC: 53 MG/DL (ref 0–149)
TSH SERPL DL<=0.005 MIU/L-ACNC: 0.13 UIU/ML (ref 0.45–4.5)
VLDLC SERPL CALC-MCNC: 10 MG/DL (ref 5–40)

## 2022-04-07 RX ORDER — LEVOTHYROXINE SODIUM 112 MCG
112 TABLET ORAL DAILY
Qty: 90 TABLET | Refills: 1 | Status: SHIPPED | OUTPATIENT
Start: 2022-04-07 | End: 2022-09-26

## 2022-06-13 RX ORDER — IBUPROFEN 800 MG/1
TABLET ORAL
Qty: 90 TABLET | Refills: 0 | Status: SHIPPED | OUTPATIENT
Start: 2022-06-13 | End: 2022-09-12

## 2022-07-08 ENCOUNTER — TELEPHONE (OUTPATIENT)
Dept: FAMILY MEDICINE CLINIC | Facility: CLINIC | Age: 62
End: 2022-07-08

## 2022-07-08 DIAGNOSIS — R79.89 LOW SERUM THYROID STIMULATING HORMONE (TSH): Primary | ICD-10-CM

## 2022-07-09 LAB — TSH SERPL DL<=0.005 MIU/L-ACNC: 0.61 UIU/ML (ref 0.45–4.5)

## 2022-09-12 RX ORDER — IBUPROFEN 800 MG/1
TABLET ORAL
Qty: 90 TABLET | Refills: 0 | Status: SHIPPED | OUTPATIENT
Start: 2022-09-12 | End: 2022-12-21

## 2022-09-26 DIAGNOSIS — B00.9 HSV INFECTION: ICD-10-CM

## 2022-09-26 DIAGNOSIS — E89.0 POSTABLATIVE HYPOTHYROIDISM: ICD-10-CM

## 2022-09-26 RX ORDER — LEVOTHYROXINE SODIUM 112 MCG
TABLET ORAL
Qty: 90 TABLET | Refills: 1 | Status: SHIPPED | OUTPATIENT
Start: 2022-09-26 | End: 2023-03-22 | Stop reason: DRUGHIGH

## 2022-09-26 RX ORDER — VALACYCLOVIR HYDROCHLORIDE 500 MG/1
TABLET, FILM COATED ORAL
Qty: 90 TABLET | Refills: 3 | Status: SHIPPED | OUTPATIENT
Start: 2022-09-26

## 2022-10-10 ENCOUNTER — OFFICE VISIT (OUTPATIENT)
Dept: FAMILY MEDICINE CLINIC | Facility: CLINIC | Age: 62
End: 2022-10-10

## 2022-10-10 VITALS
TEMPERATURE: 98.2 F | HEART RATE: 78 BPM | HEIGHT: 61 IN | SYSTOLIC BLOOD PRESSURE: 112 MMHG | WEIGHT: 144.6 LBS | DIASTOLIC BLOOD PRESSURE: 68 MMHG | OXYGEN SATURATION: 99 % | BODY MASS INDEX: 27.3 KG/M2

## 2022-10-10 DIAGNOSIS — E78.5 HYPERLIPIDEMIA, UNSPECIFIED HYPERLIPIDEMIA TYPE: ICD-10-CM

## 2022-10-10 DIAGNOSIS — J30.1 SEASONAL ALLERGIC RHINITIS DUE TO POLLEN: ICD-10-CM

## 2022-10-10 DIAGNOSIS — K21.9 GASTROESOPHAGEAL REFLUX DISEASE WITHOUT ESOPHAGITIS: ICD-10-CM

## 2022-10-10 DIAGNOSIS — Z00.01 ENCOUNTER FOR ANNUAL GENERAL MEDICAL EXAMINATION WITH ABNORMAL FINDINGS IN ADULT: Primary | ICD-10-CM

## 2022-10-10 DIAGNOSIS — Z79.899 CURRENT USE OF PROTON PUMP INHIBITOR: ICD-10-CM

## 2022-10-10 DIAGNOSIS — E89.0 POSTABLATIVE HYPOTHYROIDISM: ICD-10-CM

## 2022-10-10 DIAGNOSIS — B00.9 HSV INFECTION: ICD-10-CM

## 2022-10-10 PROCEDURE — 99396 PREV VISIT EST AGE 40-64: CPT | Performed by: FAMILY MEDICINE

## 2022-10-10 PROCEDURE — 90471 IMMUNIZATION ADMIN: CPT | Performed by: FAMILY MEDICINE

## 2022-10-10 PROCEDURE — 90686 IIV4 VACC NO PRSV 0.5 ML IM: CPT | Performed by: FAMILY MEDICINE

## 2022-10-10 NOTE — PROGRESS NOTES
Subjective   Jackie Frankel is a 62 y.o. female who presents for annual female wellness exam.  Chief Complaint   Patient presents with   • Annual Exam     No pap    Patient presents for physical as well as follow-up on her reflux and thyroid.  She had fasting labs at her last visit.  She is due for TSH today.  She is compliant with her medication and has no complaints today.       Menstrual History: menopause  Pregnancy History: G0  Sexual History: with spouse   Contraception: na  Hormone Replacement Therapy: na  Diet: standard  Exercise: yes,walks daily  Up to date with eye and dental.   Mammogram: 10/7/2022  Pap Smear: 10/2/2021 negative cotest  Bone Density: 2020 with osteopenia  (will do with next mammogram)  Colon Cancer Screenin2019 negative cscope    Immunization History   Administered Date(s) Administered   • COVID-19 (PFIZER) PURPLE CAP 2021, 2021, 2021   • Flu Vaccine Intradermal Quad 18-64YR 10/17/2016, 2017, 10/11/2018   • FluLaval/Fluzone >6mos 2020, 10/06/2021, 10/10/2022   • Flublock Quad =>18yrs 10/03/2019   • Shingrix 2021, 2021   • Tdap 10/06/2021       The following portions of the patient's history were reviewed and updated as appropriate: allergies, current medications, past family history, past medical history, past social history, past surgical history and problem list.    Past Medical History:   Diagnosis Date   • Abnormal liver function test    • Abnormal Pap smear of vagina     Abnormal Pap smear of vagina and vaginal HPV   • Allergic 1966   • Anxiety disorder    • Arthralgia of multiple sites    • BMI 27.0-27.9,adult    • Callus of foot    • Cataract    • Cervicalgia    • Colon polyps    • Eczema    • Edema extremities    • Encounter for gynecological examination without abnormal finding    • Encounter for long-term (current) use of medications    • Encounter for screening colonoscopy    • Encounter for screening for lipoid disorders     • Encounter for screening for malignant neoplasm of colon    • Generalized osteoarthritis of hand    • GERD (gastroesophageal reflux disease)    • HSV infection    • Hypothyroidism    • Left knee pain    • Menopausal symptoms    • Neck pain    • Neoplasm of uncertain behavior of skin    • Otalgia of right ear    • Pain in left shoulder    • Sleep disturbance    • Sore throat    • Strep pharyngitis    • Thyroid disease    • Viral warts    • Visit for screening mammogram        Past Surgical History:   Procedure Laterality Date   • COLONOSCOPY      Due to BRBPR 2002, 2014   • DILATATION AND CURETTAGE  2006   • EYE SURGERY         Family History   Problem Relation Age of Onset   • Arthritis Mother    • Dementia Mother    • Uterine cancer Mother    • Cancer Mother    • Thyroid disease Mother    • Cirrhosis Father    • Liver disease Father    • Hyperlipidemia Paternal Grandmother        Social History     Socioeconomic History   • Marital status:    Tobacco Use   • Smoking status: Never   • Smokeless tobacco: Never   Substance and Sexual Activity   • Alcohol use: Not Currently     Comment: drinks about 5 times per year   • Drug use: No   • Sexual activity: Yes     Partners: Male     Birth control/protection: Post-menopausal         Current Outpatient Medications:   •  ibuprofen (ADVIL,MOTRIN) 800 MG tablet, TAKE ONE TABLET BY MOUTH EVERY 6 HOURS AS NEEDED FOR MILD PAIN, Disp: 90 tablet, Rfl: 0  •  pantoprazole (PROTONIX) 40 MG EC tablet, Take 1 tablet by mouth 2 (Two) Times a Day. (Patient taking differently: Take 1 tablet by mouth Daily.), Disp: 180 tablet, Rfl: 3  •  Synthroid 112 MCG tablet, TAKE ONE TABLET BY MOUTH DAILY, Disp: 90 tablet, Rfl: 1  •  triamcinolone (KENALOG) 0.1 % cream, Apply  topically to the appropriate area as directed 2 (Two) Times a Day., Disp: 80 g, Rfl: 2  •  valACYclovir (VALTREX) 500 MG tablet, TAKE ONE TABLET BY MOUTH DAILY, Disp: 90 tablet, Rfl: 3    Review of Systems    Objective  "  Vitals:    10/10/22 1251   BP: 112/68   Pulse: 78   Temp: 98.2 °F (36.8 °C)   SpO2: 99%   Weight: 65.6 kg (144 lb 9.6 oz)   Height: 154.9 cm (61\")     Body mass index is 27.32 kg/m².  Physical Exam  Vitals and nursing note reviewed.   Constitutional:       General: She is not in acute distress.     Appearance: Normal appearance. She is well-developed.   HENT:      Head: Normocephalic and atraumatic.      Right Ear: Tympanic membrane, ear canal and external ear normal.      Left Ear: Tympanic membrane, ear canal and external ear normal.      Nose: Nose normal.      Mouth/Throat:      Mouth: Mucous membranes are moist.      Pharynx: Oropharynx is clear. No oropharyngeal exudate or posterior oropharyngeal erythema.   Eyes:      Conjunctiva/sclera: Conjunctivae normal.      Pupils: Pupils are equal, round, and reactive to light.   Neck:      Thyroid: No thyromegaly.   Cardiovascular:      Rate and Rhythm: Normal rate and regular rhythm.      Heart sounds: No murmur heard.  Pulmonary:      Effort: Pulmonary effort is normal.      Breath sounds: Normal breath sounds. No wheezing.   Abdominal:      General: Abdomen is flat. Bowel sounds are normal. There is no distension.      Palpations: Abdomen is soft. There is no mass.      Tenderness: There is no abdominal tenderness.      Hernia: No hernia is present.   Musculoskeletal:         General: No swelling. Normal range of motion.      Cervical back: Normal range of motion and neck supple.      Right lower leg: No edema.      Left lower leg: No edema.   Lymphadenopathy:      Cervical: No cervical adenopathy.   Skin:     General: Skin is warm and dry.      Capillary Refill: Capillary refill takes less than 2 seconds.      Findings: No rash.   Neurological:      General: No focal deficit present.      Mental Status: She is alert and oriented to person, place, and time.      Cranial Nerves: No cranial nerve deficit.   Psychiatric:         Mood and Affect: Mood normal.         " Behavior: Behavior normal.           Assessment & Plan   Diagnoses and all orders for this visit:    1. Encounter for annual general medical examination with abnormal findings in adult (Primary)    2. Hyperlipidemia, unspecified hyperlipidemia type    3. Gastroesophageal reflux disease without esophagitis    4. Postablative hypothyroidism  -     TSH    5. HSV infection    6. Seasonal allergic rhinitis due to pollen    7. Current use of proton pump inhibitor  -     Vitamin B12    Other orders  -     FluLaval/Fluzone >6 mos (3490-8400)               Discussed the importance of maintaining a healthy weight and getting regular exercise.  Educated patient on the benefits of healthy diet.  Advise follow-up annually for wellness exams.    There are no Patient Instructions on file for this visit.

## 2022-10-11 LAB
TSH SERPL DL<=0.005 MIU/L-ACNC: 0.39 UIU/ML (ref 0.45–4.5)
VIT B12 SERPL-MCNC: 1426 PG/ML (ref 232–1245)

## 2022-11-03 ENCOUNTER — OFFICE VISIT (OUTPATIENT)
Dept: FAMILY MEDICINE CLINIC | Facility: CLINIC | Age: 62
End: 2022-11-03

## 2022-11-03 VITALS
SYSTOLIC BLOOD PRESSURE: 108 MMHG | HEART RATE: 91 BPM | TEMPERATURE: 97.8 F | BODY MASS INDEX: 28.9 KG/M2 | HEIGHT: 60 IN | OXYGEN SATURATION: 97 % | WEIGHT: 147.2 LBS | DIASTOLIC BLOOD PRESSURE: 50 MMHG

## 2022-11-03 DIAGNOSIS — R05.9 COUGH, UNSPECIFIED TYPE: ICD-10-CM

## 2022-11-03 DIAGNOSIS — J10.1 INFLUENZA A: Primary | ICD-10-CM

## 2022-11-03 DIAGNOSIS — R09.81 NASAL CONGESTION: ICD-10-CM

## 2022-11-03 DIAGNOSIS — R06.02 SHORTNESS OF BREATH: ICD-10-CM

## 2022-11-03 LAB
EXPIRATION DATE: ABNORMAL
FLUAV AG UPPER RESP QL IA.RAPID: DETECTED
FLUBV AG UPPER RESP QL IA.RAPID: NOT DETECTED
INTERNAL CONTROL: ABNORMAL
Lab: ABNORMAL
SARS-COV-2 AG UPPER RESP QL IA.RAPID: NOT DETECTED

## 2022-11-03 PROCEDURE — 99213 OFFICE O/P EST LOW 20 MIN: CPT | Performed by: STUDENT IN AN ORGANIZED HEALTH CARE EDUCATION/TRAINING PROGRAM

## 2022-11-03 PROCEDURE — 87428 SARSCOV & INF VIR A&B AG IA: CPT | Performed by: STUDENT IN AN ORGANIZED HEALTH CARE EDUCATION/TRAINING PROGRAM

## 2022-11-03 RX ORDER — ECHINACEA PURPUREA EXTRACT 125 MG
1 TABLET ORAL AS NEEDED
Qty: 88 ML | Refills: 12 | Status: SHIPPED | OUTPATIENT
Start: 2022-11-03 | End: 2023-03-20

## 2022-11-03 RX ORDER — ALBUTEROL SULFATE 90 UG/1
2 AEROSOL, METERED RESPIRATORY (INHALATION) EVERY 4 HOURS PRN
Qty: 18 G | Refills: 0 | Status: SHIPPED | OUTPATIENT
Start: 2022-11-03 | End: 2023-03-20

## 2022-11-03 NOTE — PROGRESS NOTES
"Chief Complaint  Cough, Shortness of Breath, Nasal Congestion (/), and Fever    Subjective        Jackie Frankel presents to [facility]  History of Present Illness  Audelia is a patient of Dr. Ziggy MD, presenting with cough, congestion, shortness of breath, fever to 102F that started 3 days ago.  She cares for her grandson who has been sick.  Grandson has had multiple COVID tests which have all been negative.  She reports her symptoms actually began with a cough 4 days ago.  She has been using Tylenol and ibuprofen to help manage the fevers, except the fevers keep recurring.  She also complains of some rattling in her upper chest.  She has been taking Mucinex DM to help with this.  She has clear sinus drainage and is coughing up yellow mucus.  She also reports seasonal allergic rhinitis symptoms and takes Zyrtec as needed but has not taken it in the past few days.  She also reports having Flonase at home but she has not used it for a while.  No n/v/c/d, is drinking plenty of fluids.  She lives at home with her  who is also having similar symptoms.    Objective   Vital Signs:  /50 (BP Location: Left arm, Patient Position: Sitting)   Pulse 91   Temp 97.8 °F (36.6 °C)   Ht 152.4 cm (60\")   Wt 66.8 kg (147 lb 3.2 oz)   SpO2 97%   BMI 28.75 kg/m²   Estimated body mass index is 28.75 kg/m² as calculated from the following:    Height as of this encounter: 152.4 cm (60\").    Weight as of this encounter: 66.8 kg (147 lb 3.2 oz).          Physical Exam  Vitals reviewed.   Constitutional:       General: She is not in acute distress.     Appearance: Normal appearance.   HENT:      Head: Normocephalic and atraumatic.      Right Ear: Tympanic membrane, ear canal and external ear normal. There is no impacted cerumen.      Left Ear: Tympanic membrane, ear canal and external ear normal. There is no impacted cerumen.      Nose: Congestion and rhinorrhea present.      Mouth/Throat: "      Mouth: Mucous membranes are moist.      Pharynx: Oropharynx is clear. No oropharyngeal exudate or posterior oropharyngeal erythema.   Eyes:      Extraocular Movements: Extraocular movements intact.      Conjunctiva/sclera: Conjunctivae normal.   Cardiovascular:      Rate and Rhythm: Normal rate and regular rhythm.      Heart sounds: Normal heart sounds. No murmur heard.    No friction rub. No gallop.   Pulmonary:      Effort: Pulmonary effort is normal.      Breath sounds: Wheezing (Bilateral inspiratory wheezes R > L) and rhonchi (Bilateral expiratory rhonchi) present. No rales.   Musculoskeletal:      Cervical back: No tenderness.   Lymphadenopathy:      Cervical: No cervical adenopathy.   Neurological:      General: No focal deficit present.      Mental Status: She is alert. Mental status is at baseline.        Result Review :                Assessment and Plan   Diagnoses and all orders for this visit:    1. Influenza A (Primary)  -     albuterol sulfate  (90 Base) MCG/ACT inhaler; Inhale 2 puffs Every 4 (Four) Hours As Needed for Wheezing or Shortness of Air.  Dispense: 18 g; Refill: 0    2. Shortness of breath  -     POCT SARS-CoV-2 Antigen JOSLYN  -     albuterol sulfate  (90 Base) MCG/ACT inhaler; Inhale 2 puffs Every 4 (Four) Hours As Needed for Wheezing or Shortness of Air.  Dispense: 18 g; Refill: 0    3. Nasal congestion  -     POCT SARS-CoV-2 Antigen JOSLYN  -     sodium chloride (Ocean Nasal Spray) 0.65 % nasal spray; 1 spray into the nostril(s) as directed by provider As Needed for Congestion.  Dispense: 88 mL; Refill: 12    4. Cough, unspecified type    Audelia is a patient of Dr. Ziggy MD, presenting with cough, congestion, shortness of breath, fever to 102F that started 3 days ago.  Tests in clinic today show patient is positive for influenza A, negative for COVID.  Recommending supportive care with alternating Tylenol/ibuprofen.  Saline nasal spray and Flonase for congestion.   Mucinex DM to loosen secretions.  Also albuterol inhaler for shortness of breath and wheezing.  She is out of the window for Tamiflu.  Return precautions given.       Follow Up   Return in about 5 months (around 4/11/2023) for Dr. Trinh, as previously scheduled.  Patient was given instructions and counseling regarding her condition or for health maintenance advice. Please see specific information pulled into the AVS if appropriate.

## 2022-11-03 NOTE — PATIENT INSTRUCTIONS
Your test in clinic today came back positive for influenza A.  Since her symptoms started several days ago, you are outside of the window for Tamiflu treatment.  You should alternate Tylenol/Motrin to help manage her fevers.  Make sure you get plenty of fluids.  I sent an an albuterol inhaler that she can use as often as every 4 hours a day to help with shortness of breath and wheezing.  The Mucinex DM that you are taking should also help.  You may also start your Flonase for congestion.  Saline nasal spray can also help with congestion and nasal discomfort.  If your shortness of breath worsens, if you develop high fevers that you cannot manage with the Tylenol/Motrin, or if your symptoms worsen significantly, please go straight to the ER.  Feel free to call our office if we can be of further assistance.

## 2022-11-09 ENCOUNTER — OFFICE VISIT (OUTPATIENT)
Dept: FAMILY MEDICINE CLINIC | Facility: CLINIC | Age: 62
End: 2022-11-09

## 2022-11-09 VITALS
BODY MASS INDEX: 28.54 KG/M2 | HEIGHT: 60 IN | OXYGEN SATURATION: 97 % | DIASTOLIC BLOOD PRESSURE: 76 MMHG | SYSTOLIC BLOOD PRESSURE: 130 MMHG | WEIGHT: 145.4 LBS | HEART RATE: 72 BPM | TEMPERATURE: 98.9 F

## 2022-11-09 DIAGNOSIS — J10.1 INFLUENZA A: Primary | ICD-10-CM

## 2022-11-09 DIAGNOSIS — J20.8 ACUTE BRONCHITIS DUE TO OTHER SPECIFIED ORGANISMS: ICD-10-CM

## 2022-11-09 PROCEDURE — 99213 OFFICE O/P EST LOW 20 MIN: CPT | Performed by: FAMILY MEDICINE

## 2022-11-09 RX ORDER — DOXYCYCLINE HYCLATE 100 MG/1
100 CAPSULE ORAL 2 TIMES DAILY
Qty: 14 CAPSULE | Refills: 0 | Status: SHIPPED | OUTPATIENT
Start: 2022-11-09 | End: 2022-11-16

## 2022-11-09 RX ORDER — DEXTROMETHORPHAN HYDROBROMIDE AND PROMETHAZINE HYDROCHLORIDE 15; 6.25 MG/5ML; MG/5ML
5 SYRUP ORAL 4 TIMES DAILY PRN
Qty: 180 ML | Refills: 0 | Status: SHIPPED | OUTPATIENT
Start: 2022-11-09 | End: 2023-03-20

## 2022-11-09 NOTE — PROGRESS NOTES
"Chief Complaint  Cough and possible pneumonia  (Seen romain 11/3   not any better  dx with pneumonia )    Subjective    {Problem List  Visit Diagnosis   Encounters  Notes  Medications  Labs  Result Review Imaging  Media :23}    Jackie Frankel presents to [facility]  History of Present Illness   Cough  The patient presents today with complaints of a cough. She states her symptoms began on, 10/30/2022 when her grandson had a cough and rhinorrhea.  She states that her grandson had a respiratory illness. She also states that her grandson COVID-19 and influenza test was both negative. She states on 11/01/2022, she started to experience the symptoms, and so did her . She tested positive for influenza A on 11/03/2022.  She states that [facility], and was diagnosed with pneumonia. She denies running a fever. She denies any chest pain. She reports that when she wakes up in the morning she would listen to herself, and it sounds like \"gravel\". She thought that it would be bronchitis. She denies a sore throat. She denies Dr. Yo giving her cough syrup. She states that he only gave her an inhaler and saline solution for her nose. She admits to not using the saline solution. She admits to not experiencing tightness, and pain that was in her lower lung when she had pneumonia in the past. She has been taking Mucinex DM for the past 1.5 weeks. She admits to having trouble sleeping for about 3 to 4 days because of the constant cough.  Cough is productive with dark sputum.  No fever for few days.    Health maintenance   The patient received her influenza vaccine on 10/10/2022.    Objective   Vital Signs:  /76   Pulse 72   Temp 98.9 °F (37.2 °C)   Ht 152.4 cm (60\")   Wt 66 kg (145 lb 6.4 oz)   SpO2 97%   BMI 28.40 kg/m²   Estimated body mass index is 28.4 kg/m² as calculated from the following:    Height as of this " "encounter: 152.4 cm (60\").    Weight as of this encounter: 66 kg (145 lb 6.4 oz).          Physical Exam  Constitutional:       General: She is not in acute distress.     Appearance: Normal appearance. She is well-developed.   HENT:      Head: Normocephalic and atraumatic.      Right Ear: Tympanic membrane, ear canal and external ear normal.      Left Ear: Tympanic membrane, ear canal and external ear normal.      Mouth/Throat:      Mouth: Mucous membranes are moist.      Pharynx: Oropharynx is clear. Posterior oropharyngeal erythema present.   Eyes:      Conjunctiva/sclera: Conjunctivae normal.      Pupils: Pupils are equal, round, and reactive to light.   Neck:      Thyroid: No thyromegaly.   Cardiovascular:      Rate and Rhythm: Normal rate and regular rhythm.      Heart sounds: No murmur heard.  Pulmonary:      Effort: Pulmonary effort is normal.      Breath sounds: Wheezing and rhonchi present. No rales.      Comments: Few end expiratory wheezes  Abdominal:      General: Bowel sounds are normal.      Palpations: Abdomen is soft.      Tenderness: There is no abdominal tenderness.   Musculoskeletal:         General: Normal range of motion.      Cervical back: Neck supple.   Lymphadenopathy:      Cervical: Cervical adenopathy present.   Skin:     General: Skin is warm and dry.   Neurological:      Mental Status: She is alert and oriented to person, place, and time.   Psychiatric:         Mood and Affect: Mood normal.         Behavior: Behavior normal.        Result Review :               Assessment and Plan   Diagnoses and all orders for this visit:    1. Influenza A (Primary)    2. Acute bronchitis due to other specified organisms  -     promethazine-dextromethorphan (PROMETHAZINE-DM) 6.25-15 MG/5ML syrup; Take 5 mL by mouth 4 (Four) Times a Day As Needed for Cough.  Dispense: 180 mL; Refill: 0  -     doxycycline (VIBRAMYCIN) 100 MG capsule; Take 1 capsule by mouth 2 (Two) Times a Day for 7 days.  Dispense: 14 " capsule; Refill: 0    1. Influenza with bronchitis  - The patient will start a round of antibiotics as prescribed.  - She was prescribed to Phenergan DM.  - She was advised to get some rest.  - We will perform a chest x-ray if she starts experiencing chest pain or shortness of breath.  - She was advised to stop taking Mucinex DM while taking Phenergan DM.  - She was advised to contact me if her symptoms worsen.        Transcribed from ambient dictation for Meredith Lea Kehrer, MD by Lazaro Holt.  11/09/22   19:01 EST    Patient or patient representative verbalized consent to the visit recording.  I have personally performed the services described in this document as transcribed by the above individual, and it is both accurate and complete.    Follow Up   No follow-ups on file.  Patient was given instructions and counseling regarding her condition or for health maintenance advice. Please see specific information pulled into the AVS if appropriate.

## 2022-12-21 RX ORDER — IBUPROFEN 800 MG/1
TABLET ORAL
Qty: 90 TABLET | Refills: 0 | Status: SHIPPED | OUTPATIENT
Start: 2022-12-21 | End: 2023-03-20

## 2023-02-27 ENCOUNTER — TELEPHONE (OUTPATIENT)
Dept: FAMILY MEDICINE CLINIC | Facility: CLINIC | Age: 63
End: 2023-02-27
Payer: COMMERCIAL

## 2023-02-27 NOTE — TELEPHONE ENCOUNTER
Caller: FrankelAudelia coombs    Relationship: Self    Best call back number: [phone]    What is the best time to reach you: ANYTIME   Who are you requesting to speak with (clinical staff, provider,  specific staff member): CLINICAL STAFF  Do you know the name of the person who called: SELF   What was the call regarding: PATIENT CALLED AND STATED HER AND  TESTED POSITIVE FOR COVID ON 2/26/23. PATIENT STATES HER SYMPTOMS ARE LIKE SINUS SYMPTOMS,  SCRATCHY THROAT AND COUGH. PATIENT WOULD LIKE TO REQUEST SOME MEDICINE FOR HER. PLEASE CALL MEDICATION IN TO [facility]  ON [address].  PLEASE ADVISE. THANKS   Do you require a callback: YES

## 2023-02-27 NOTE — TELEPHONE ENCOUNTER
Pt advised she is feeling better, and doesn't want to schedule an appt right now. Stated she will call if she starts feeling worse

## 2023-03-20 ENCOUNTER — OFFICE VISIT (OUTPATIENT)
Dept: FAMILY MEDICINE CLINIC | Facility: CLINIC | Age: 63
End: 2023-03-20
Payer: MEDICARE

## 2023-03-20 VITALS
TEMPERATURE: 98.1 F | HEART RATE: 100 BPM | BODY MASS INDEX: 28.27 KG/M2 | DIASTOLIC BLOOD PRESSURE: 80 MMHG | SYSTOLIC BLOOD PRESSURE: 124 MMHG | HEIGHT: 60 IN | OXYGEN SATURATION: 100 % | WEIGHT: 144 LBS

## 2023-03-20 DIAGNOSIS — S29.012A STRAIN OF THORACIC BACK REGION: Primary | ICD-10-CM

## 2023-03-20 DIAGNOSIS — E89.0 POSTABLATIVE HYPOTHYROIDISM: ICD-10-CM

## 2023-03-20 RX ORDER — TIZANIDINE 4 MG/1
2-4 TABLET ORAL EVERY 8 HOURS PRN
Qty: 30 TABLET | Refills: 0 | Status: SHIPPED | OUTPATIENT
Start: 2023-03-20

## 2023-03-20 RX ORDER — MELOXICAM 15 MG/1
TABLET ORAL
Qty: 30 TABLET | Refills: 0 | Status: SHIPPED | OUTPATIENT
Start: 2023-03-20

## 2023-03-20 NOTE — PROGRESS NOTES
"Chief Complaint  Shoulder Pain (Left shoulder   /No injury/Over 1 week )    Subjective        Jackie Frankel presents to [facility]  History of Present Illness  Left shoulder pain for the past 1-2 weeks.  Would get sore before going to bed.  She would try some aspercreme.  Would be OK in the morning and then do her usual stuff including yoga.  Started hurting during the afternoon a few days ago.  Later that day, had a sudden sharp pain.  Feels better to hold it up.  Can not figure out exactly which movements elicit it.  Gets better right away though.  Can sleep OK.  Can rest without pain.  It has been limiting her activity.  Friends brought her a sling that has helped some.  Hurts worse for it to hang low.         Objective   Vital Signs:  /80   Pulse 100   Temp 98.1 °F (36.7 °C)   Ht 152.4 cm (60\")   Wt 65.3 kg (144 lb)   SpO2 100%   BMI 28.12 kg/m²   Estimated body mass index is 28.12 kg/m² as calculated from the following:    Height as of this encounter: 152.4 cm (60\").    Weight as of this encounter: 65.3 kg (144 lb).             Physical Exam  Constitutional:       General: She is not in acute distress.     Appearance: Normal appearance. She is well-developed.   HENT:      Head: Normocephalic and atraumatic.      Right Ear: External ear normal.      Left Ear: External ear normal.   Eyes:      Conjunctiva/sclera: Conjunctivae normal.      Pupils: Pupils are equal, round, and reactive to light.   Neck:      Thyroid: No thyromegaly.   Pulmonary:      Effort: Pulmonary effort is normal.   Musculoskeletal:         General: Tenderness present. No swelling or deformity. Normal range of motion.      Comments: Left shoulder with full range of motion and good strength, patient actually tender to palpation just left of her thoracic spine next of the scapula, minimal muscle spasm, no masses   Neurological:      Mental Status: She is alert and oriented to person, place, and time. "   Psychiatric:         Mood and Affect: Mood normal.         Behavior: Behavior normal.        Result Review :                   Assessment and Plan   Diagnoses and all orders for this visit:    1. Strain of thoracic back region (Primary)  -     tiZANidine (ZANAFLEX) 4 MG tablet; Take 0.5-1 tablets by mouth Every 8 (Eight) Hours As Needed for Muscle Spasms.  Dispense: 30 tablet; Refill: 0  -     meloxicam (MOBIC) 15 MG tablet; Take 1 tab daily for 2 weeks then as need  Dispense: 30 tablet; Refill: 0    Try ice and then later heat, let me know if no better next week         Follow Up   No follow-ups on file.  Patient was given instructions and counseling regarding her condition or for health maintenance advice. Please see specific information pulled into the AVS if appropriate.

## 2023-03-21 LAB — TSH SERPL DL<=0.005 MIU/L-ACNC: 0.32 UIU/ML (ref 0.45–4.5)

## 2023-03-22 DIAGNOSIS — E89.0 POSTABLATIVE HYPOTHYROIDISM: Primary | ICD-10-CM

## 2023-03-22 RX ORDER — LEVOTHYROXINE SODIUM 100 MCG
100 TABLET ORAL DAILY
Qty: 90 TABLET | Refills: 3 | Status: SHIPPED | OUTPATIENT
Start: 2023-03-22

## 2023-04-11 ENCOUNTER — OFFICE VISIT (OUTPATIENT)
Dept: FAMILY MEDICINE CLINIC | Facility: CLINIC | Age: 63
End: 2023-04-11
Payer: MEDICARE

## 2023-04-11 VITALS
WEIGHT: 144.2 LBS | HEIGHT: 60 IN | TEMPERATURE: 98 F | DIASTOLIC BLOOD PRESSURE: 58 MMHG | BODY MASS INDEX: 28.31 KG/M2 | HEART RATE: 68 BPM | OXYGEN SATURATION: 98 % | SYSTOLIC BLOOD PRESSURE: 114 MMHG

## 2023-04-11 DIAGNOSIS — E55.9 VITAMIN D DEFICIENCY, UNSPECIFIED: ICD-10-CM

## 2023-04-11 DIAGNOSIS — S29.012A STRAIN OF THORACIC BACK REGION: ICD-10-CM

## 2023-04-11 DIAGNOSIS — M54.2 NECK PAIN: ICD-10-CM

## 2023-04-11 DIAGNOSIS — Z00.00 WELCOME TO MEDICARE PREVENTIVE VISIT: Primary | ICD-10-CM

## 2023-04-11 DIAGNOSIS — Z23 NEED FOR PNEUMOCOCCAL VACCINE: ICD-10-CM

## 2023-04-11 DIAGNOSIS — Z79.899 CURRENT USE OF PROTON PUMP INHIBITOR: ICD-10-CM

## 2023-04-11 DIAGNOSIS — Z12.31 VISIT FOR SCREENING MAMMOGRAM: ICD-10-CM

## 2023-04-11 DIAGNOSIS — E89.0 POSTABLATIVE HYPOTHYROIDISM: ICD-10-CM

## 2023-04-11 DIAGNOSIS — Z78.0 MENOPAUSE: ICD-10-CM

## 2023-04-11 DIAGNOSIS — Z91.81 AT MODERATE RISK FOR FALL: ICD-10-CM

## 2023-04-11 DIAGNOSIS — E78.5 HYPERLIPIDEMIA, UNSPECIFIED HYPERLIPIDEMIA TYPE: ICD-10-CM

## 2023-04-11 RX ORDER — TIZANIDINE 4 MG/1
2-4 TABLET ORAL EVERY 8 HOURS PRN
Qty: 30 TABLET | Refills: 0 | Status: SHIPPED | OUTPATIENT
Start: 2023-04-11

## 2023-04-11 NOTE — PATIENT INSTRUCTIONS
Fall Prevention in the Home, Adult  Falls can cause injuries and affect people of all ages. There are many simple things that you can do to make your home safe and to help prevent falls. Ask for help when making these changes, if needed.  What actions can I take to prevent falls?  General instructions  Use good lighting in all rooms. Replace any light bulbs that burn out, turn on lights if it is dark, and use night-lights.  Place frequently used items in easy-to-reach places. Lower the shelves around your home if necessary.  Set up furniture so that there are clear paths around it. Avoid moving your furniture around.  Remove throw rugs and other tripping hazards from the floor.  Avoid walking on wet floors.  Fix any uneven floor surfaces.  Add color or contrast paint or tape to grab bars and handrails in your home. Place contrasting color strips on the first and last steps of staircases.  When you use a stepladder, make sure that it is completely opened and that the sides and supports are firmly locked. Have someone hold the ladder while you are using it. Do not climb a closed stepladder.  Know where your pets are when moving through your home.  What can I do in the bathroom?     Keep the floor dry. Immediately clean up any water that is on the floor.  Remove soap buildup in the tub or shower regularly.  Use nonskid mats or decals on the floor of the tub or shower.  Attach bath mats securely with double-sided, nonslip rug tape.  If you need to sit down while you are in the shower, use a plastic, nonslip stool.  Install grab bars by the toilet and in the tub and shower. Do not use towel bars as grab bars.  What can I do in the bedroom?  Make sure that a bedside light is easy to reach.  Do not use oversized bedding that reaches the floor.  Have a firm chair that has side arms to use for getting dressed.  What can I do in the kitchen?  Clean up any spills right away.  If you need to reach for something above you,  use a sturdy step stool that has a grab bar.  Keep electrical cables out of the way.  Do not use floor polish or wax that makes floors slippery. If you must use wax, make sure that it is non-skid floor wax.  What can I do with my stairs?  Do not leave any items on the stairs.  Make sure that you have a light switch at the top and the bottom of the stairs. Have them installed if you do not have them.  Make sure that there are handrails on both sides of the stairs. Fix handrails that are broken or loose. Make sure that handrails are as long as the staircases.  Install non-slip stair treads on all stairs in your home.  Avoid having throw rugs at the top or bottom of stairs, or secure the rugs with carpet tape to prevent them from moving.  Choose a carpet design that does not hide the edge of steps on the stairs.  Check any carpeting to make sure that it is firmly attached to the stairs. Fix any carpet that is loose or worn.  What can I do on the outside of my home?  Use bright outdoor lighting.  Regularly repair the edges of walkways and driveways and fix any cracks.  Remove high doorway thresholds.  Trim any shrubbery on the main path into your home.  Regularly check that handrails are securely fastened and in good repair. Both sides of all steps should have handrails.  Install guardrails along the edges of any raised decks or porches.  Clear walkways of debris and clutter, including tools and rocks.  Have leaves, snow, and ice cleared regularly.  Use sand or salt on walkways during winter months.  In the garage, clean up any spills right away, including grease or oil spills.  What other actions can I take?  Wear closed-toe shoes that fit well and support your feet. Wear shoes that have rubber soles or low heels.  Use mobility aids as needed, such as canes, walkers, scooters, and crutches.  Review your medicines with your health care provider. Some medicines can cause dizziness or changes in blood pressure, which  increase your risk of falling.  Talk with your health care provider about other ways that you can decrease your risk of falls. This may include working with a physical therapist or  to improve your strength, balance, and endurance.  Where to find more information  Centers for Disease Control and Prevention[place]: www.cdc.gov  [place] on Aging: www.pamela.nih.gov  Contact a health care provider if:  You are afraid of falling at home.  You feel weak, drowsy, or dizzy at home.  You fall at home.  Summary  There are many simple things that you can do to make your home safe and to help prevent falls.  Ways to make your home safe include removing tripping hazards and installing grab bars in the bathroom.  Ask for help when making these changes in your home.  This information is not intended to replace advice given to you by your health care provider. Make sure you discuss any questions you have with your health care provider.  Document Revised: 2022 Document Reviewed: 2021  [employer] Patient Education ©  [employer]    Medicare Wellness  Personal Prevention Plan of Service     Date of Office Visit:    Encounter Provider:  Meredith Lea Kehrer, MD  Place of Service:  [facility]  Patient Name: Jackie Frankel  :  1960    As part of the Medicare Wellness portion of your visit today, we are providing you with this personalized preventive plan of services (PPPS). This plan is based upon recommendations of the [place] Preventive Services Task Force (USPSTF) and the Advisory Committee on Immunization Practices (ACIP).    This lists the preventive care services that should be considered, and provides dates of when you are due. Items listed as completed are up-to-date and do not require any further intervention.    Health Maintenance   Topic Date Due    COVID-19 Vaccine (4 - Booster for Pfizer series) 2022    DXA SCAN  2022    Pneumococcal  Vaccine 65+ (1 - PCV) Never done    LIPID PANEL  04/06/2023    INFLUENZA VACCINE  08/01/2023    ANNUAL WELLNESS VISIT  04/11/2024    PAP SMEAR  10/06/2024    MAMMOGRAM  10/07/2024    COLORECTAL CANCER SCREENING  04/25/2029    TDAP/TD VACCINES (2 - Td or Tdap) 10/06/2031    HEPATITIS C SCREENING  Completed    ZOSTER VACCINE  Completed       Orders Placed This Encounter   Procedures    XR Spine Cervical 2 or 3 View     Standing Status:   Future     Standing Expiration Date:   4/11/2024     Order Specific Question:   Reason for Exam:     Answer:   pain     Order Specific Question:   Release to patient     Answer:   Routine Release    XR Spine Thoracic 2 View     Standing Status:   Future     Standing Expiration Date:   4/11/2024     Order Specific Question:   Reason for Exam:     Answer:   pain     Order Specific Question:   Release to patient     Answer:   Routine Release    Mammo Screening Digital Tomosynthesis Bilateral With CAD     Standing Status:   Future     Standing Expiration Date:   4/11/2024     Order Specific Question:   Reason for Exam:     Answer:   screening    DEXA Bone Density Axial     Standing Status:   Future     Standing Expiration Date:   4/11/2024     Order Specific Question:   Reason for Exam:     Answer:   screening    Pneumococcal Conjugate Vaccine 20-Valent (PCV20)    Lipid Panel    Comprehensive Metabolic Panel     Order Specific Question:   Release to patient     Answer:   Routine Release    Vitamin B12     Order Specific Question:   Release to patient     Answer:   Routine Release    Vitamin D,25-Hydroxy     Order Specific Question:   Release to patient     Answer:   Routine Release    CBC & Differential     Order Specific Question:   Manual Differential     Answer:   No       Return in about 3 months (around 7/11/2023) for lab, tsh only.

## 2023-04-11 NOTE — PROGRESS NOTES
The ABCs of the Annual Wellness Visit  [place] to Medicare Visit    Subjective     Jackie Frankel is a 63 y.o. female who presents for a  Welcome to Medicare Visit.    The following portions of the patient's history were reviewed and   updated as appropriate: allergies, current medications, past family history, past medical history, past social history, past surgical history and problem list.     Compared to one year ago, the patient feels her physical   health is the same.    Compared to one year ago, the patient feels her mental   health is the same.    Recent Hospitalizations:  She was not admitted to the hospital during the last year.       Current Medical Providers:  Patient Care Team:  Kehrer, Meredith Lea, MD as PCP - General (Family Medicine)    Outpatient Medications Prior to Visit   Medication Sig Dispense Refill   • meloxicam (MOBIC) 15 MG tablet Take 1 tab daily for 2 weeks then as need 30 tablet 0   • pantoprazole (PROTONIX) 40 MG EC tablet Take 1 tablet by mouth 2 (Two) Times a Day. (Patient taking differently: Take 1 tablet by mouth Daily.) 180 tablet 3   • Synthroid 100 MCG tablet Take 1 tablet by mouth Daily. 90 tablet 3   • triamcinolone (KENALOG) 0.1 % cream Apply  topically to the appropriate area as directed 2 (Two) Times a Day. 80 g 2   • valACYclovir (VALTREX) 500 MG tablet TAKE ONE TABLET BY MOUTH DAILY 90 tablet 3   • tiZANidine (ZANAFLEX) 4 MG tablet Take 0.5-1 tablets by mouth Every 8 (Eight) Hours As Needed for Muscle Spasms. 30 tablet 0     No facility-administered medications prior to visit.       No opioid medication identified on active medication list. I have reviewed chart for other potential  high risk medication/s and harmful drug interactions in the elderly.          Aspirin is not on active medication list.  Aspirin use is not indicated based on review of current medical condition/s. Risk of harm outweighs potential benefits.  .    Patient Active Problem List   Diagnosis   •  "Hypothyroidism   • GERD (gastroesophageal reflux disease)   • HSV infection   • Acute cystitis with hematuria   • Hyperlipidemia   • Sleep disturbance   • Eczema   • Ischial pain, left     Advance Care Planning   Advance Care Planning     Advance Directive is not on file.  ACP discussion was held with the patient during this visit. Patient does not have an advance directive, information provided.       Objective   Vitals:    04/11/23 0822   BP: 114/58   Pulse: 68   Temp: 98 °F (36.7 °C)   SpO2: 98%   Weight: 65.4 kg (144 lb 3.2 oz)   Height: 152.4 cm (60\")     Estimated body mass index is 28.16 kg/m² as calculated from the following:    Height as of this encounter: 152.4 cm (60\").    Weight as of this encounter: 65.4 kg (144 lb 3.2 oz).    BMI is >= 25 and <30. (Overweight) The following options were offered after discussion;: none (medical contraindication)      Does the patient have evidence of cognitive impairment?   No           ECG 12 Lead    Date/Time: 4/11/2023 11:24 AM  Performed by: Kehrer, Meredith Lea, MD  Authorized by: Kehrer, Meredith Lea, MD   Comparison: not compared with previous ECG   Previous ECG: no previous ECG available  Rhythm: sinus rhythm and sinus arrhythmia  Rate: normal  BPM: 64  Conduction: conduction normal  ST Segments: ST segments normal  T Waves: T waves normal  QRS axis: normal  Other: no other findings    Clinical impression: normal ECG               HEALTH RISK ASSESSMENT    Smoking Status:  Social History     Tobacco Use   Smoking Status Never   Smokeless Tobacco Never     Alcohol Consumption:  Social History     Substance and Sexual Activity   Alcohol Use Not Currently    Comment: drinks about 5 times per year       Fall Risk Screen:    [facility] Fall Risk Assessment was completed, and patient is at HIGH risk for falls. Assessment completed on:4/11/2023    Depression Screen:       4/11/2023     8:20 AM   PHQ-2/PHQ-9 Depression Screening   Little Interest or Pleasure in Doing Things " 0-->not at all   Feeling Down, Depressed or Hopeless 0-->not at all   PHQ-9: Brief Depression Severity Measure Score 0       Health Habits and Functional and Cognitive Screenin/11/2023     8:21 AM   Functional & Cognitive Status   Do you have difficulty preparing food and eating? No   Do you have difficulty bathing yourself, getting dressed or grooming yourself? No   Do you have difficulty using the toilet? No   Do you have difficulty moving around from place to place? No   Do you have trouble with steps or getting out of a bed or a chair? No   Current Diet Well Balanced Diet   Dental Exam Up to date   Eye Exam Up to date   Exercise (times per week) 2 times per week   Current Exercises Include Walking;House Cleaning   Do you need help using the phone?  No   Are you deaf or do you have serious difficulty hearing?  No   Do you need help with transportation? No   Do you need help shopping? No   Do you need help preparing meals?  No   Do you need help with housework?  No   Do you need help with laundry? No   Do you need help taking your medications? No   Do you need help managing money? No   Do you ever drive or ride in a car without wearing a seat belt? No       Visual Acuity:    Vision Screening    Right eye Left eye Both eyes   Without correction   20/50   With correction   20/25       Age-appropriate Screening Schedule:  Refer to the list below for future screening recommendations based on patient's age, sex and/or medical conditions. Orders for these recommended tests are listed in the plan section. The patient has been provided with a written plan.    Health Maintenance   Topic Date Due   • COVID-19 Vaccine (4 - Booster for Pfizer series) 2022   • DXA SCAN  2022   • LIPID PANEL  2023   • INFLUENZA VACCINE  2023   • ANNUAL WELLNESS VISIT  2024   • PAP SMEAR  10/06/2024   • MAMMOGRAM  10/07/2024   • COLORECTAL CANCER SCREENING  2029   • TDAP/TD VACCINES (2 - Td or Tdap)  10/06/2031   • HEPATITIS C SCREENING  Completed   • Pneumococcal Vaccine 65+  Completed   • ZOSTER VACCINE  Completed        CMS Preventative Services Quick Reference  Risk Factors Identified During Encounter    None Identified  The above risks/problems have been discussed with the patient.  Pertinent information has been shared with the patient in the After Visit Summary.    Follow Up:   Initial Medicare Visit in one year    An After Visit Summary and PPPS were made available to the patient.      Additional E&M Note during same encounter follows:  Patient has multiple medical problems which are significant and separately identifiable that require additional work above and beyond the Medicare Wellness Visit.      Chief Complaint  Medicare Wellness-Initial Visit, Neck Pain, Hyperlipidemia, and Hypothyroidism    Subjective        HPI  Jackie Frankel is also being seen today for follow-up on her back pain.  She was doing better and then flared up last week.  She has not been able to be as active.  She denies any numbness or tingling going down the arm.  She denies any injury.  She is also due to follow-up on her hypothyroidism, and her reflux.  She is compliant with her medication.  She does have a TSH that was a little low so we adjusted her medication.  She will have to have her TSH again in a few months.    Review of Systems   Constitutional: Negative for chills, fatigue and fever.   HENT: Negative for congestion, ear pain and sore throat.    Eyes: Negative for visual disturbance.   Respiratory: Negative for cough and shortness of breath.    Cardiovascular: Negative for chest pain, palpitations and leg swelling.   Gastrointestinal: Negative for diarrhea, nausea and vomiting.   Endocrine: Negative.    Genitourinary: Negative for dysuria and frequency.   Musculoskeletal: Positive for arthralgias, back pain and neck pain.   Skin: Negative.    Psychiatric/Behavioral: Negative for dysphoric mood and sleep disturbance.  "The patient is not nervous/anxious.        Objective   Vital Signs:  /58   Pulse 68   Temp 98 °F (36.7 °C)   Ht 152.4 cm (60\")   Wt 65.4 kg (144 lb 3.2 oz)   SpO2 98%   BMI 28.16 kg/m²     Physical Exam  Vitals and nursing note reviewed.   Constitutional:       General: She is not in acute distress.     Appearance: Normal appearance. She is well-developed.   HENT:      Head: Normocephalic and atraumatic.      Right Ear: Tympanic membrane, ear canal and external ear normal.      Left Ear: Tympanic membrane, ear canal and external ear normal.      Nose: Nose normal.      Mouth/Throat:      Mouth: Mucous membranes are moist.      Pharynx: Oropharynx is clear. No oropharyngeal exudate or posterior oropharyngeal erythema.   Eyes:      Conjunctiva/sclera: Conjunctivae normal.      Pupils: Pupils are equal, round, and reactive to light.   Neck:      Thyroid: No thyromegaly.   Cardiovascular:      Rate and Rhythm: Normal rate and regular rhythm.      Heart sounds: No murmur heard.  Pulmonary:      Effort: Pulmonary effort is normal.      Breath sounds: Normal breath sounds. No wheezing.   Abdominal:      General: Abdomen is flat. Bowel sounds are normal. There is no distension.      Palpations: Abdomen is soft. There is no mass.      Tenderness: There is no abdominal tenderness.      Hernia: No hernia is present.   Musculoskeletal:         General: Tenderness present. No swelling. Normal range of motion.      Cervical back: Normal range of motion and neck supple.      Right lower leg: No edema.      Left lower leg: No edema.      Comments: Tender through the lower cervical and upper thoracic paraspinous muscles on the left   Lymphadenopathy:      Cervical: No cervical adenopathy.   Skin:     General: Skin is warm and dry.      Capillary Refill: Capillary refill takes less than 2 seconds.      Findings: No rash.   Neurological:      General: No focal deficit present.      Mental Status: She is alert and oriented " to person, place, and time.      Cranial Nerves: No cranial nerve deficit.      Sensory: No sensory deficit.      Motor: No weakness.      Coordination: Coordination normal.      Gait: Gait normal.      Deep Tendon Reflexes: Reflexes normal.   Psychiatric:         Mood and Affect: Mood normal.         Behavior: Behavior normal.        TSH (03/20/2023 14:16)                Assessment and Plan   Diagnoses and all orders for this visit:    1. Welcome to Medicare preventive visit (Primary)    2. Strain of thoracic back region  -     XR Spine Cervical 2 or 3 View; Future  -     tiZANidine (ZANAFLEX) 4 MG tablet; Take 0.5-1 tablets by mouth Every 8 (Eight) Hours As Needed for Muscle Spasms.  Dispense: 30 tablet; Refill: 0    3. Neck pain  -     XR Spine Thoracic 2 View; Future    4. Current use of proton pump inhibitor  -     Vitamin B12    5. Postablative hypothyroidism  -     Vitamin D,25-Hydroxy    6. Hyperlipidemia, unspecified hyperlipidemia type  -     Lipid Panel  -     CBC & Differential  -     Comprehensive Metabolic Panel    7. At moderate risk for fall    8. Need for pneumococcal vaccine  -     Pneumococcal Conjugate Vaccine 20-Valent (PCV20)    9. Menopause  -     DEXA Bone Density Axial; Future    10. Visit for screening mammogram  -     Mammo Screening Digital Tomosynthesis Bilateral With CAD; Future    11. Vitamin D deficiency, unspecified  -     Vitamin D,25-Hydroxy    Other orders  -     ECG 12 Lead    Strain of thoracic back and neck pain-we will check x-rays, muscle relaxer refilled, plan of care after x-ray results  Hypothyroidism-TSH low at last visit, will adjust medication and recheck in 3 months  Routine health maintenance-mammogram and bone density due in October         Follow Up   Return in about 3 months (around 7/11/2023) for lab, tsh only.  Patient was given instructions and counseling regarding her condition or for health maintenance advice. Please see specific information pulled into the  AVS if appropriate.

## 2023-04-12 LAB
25(OH)D3+25(OH)D2 SERPL-MCNC: 39 NG/ML (ref 30–100)
ALBUMIN SERPL-MCNC: 4.4 G/DL (ref 3.5–5.2)
ALBUMIN/GLOB SERPL: 2.2 G/DL
ALP SERPL-CCNC: 71 U/L (ref 39–117)
ALT SERPL-CCNC: 20 U/L (ref 1–33)
AST SERPL-CCNC: 26 U/L (ref 1–32)
BASOPHILS # BLD AUTO: 0.07 10*3/MM3 (ref 0–0.2)
BASOPHILS NFR BLD AUTO: 1.5 % (ref 0–1.5)
BILIRUB SERPL-MCNC: 0.8 MG/DL (ref 0–1.2)
BUN SERPL-MCNC: 17 MG/DL (ref 8–23)
BUN/CREAT SERPL: 20.2 (ref 7–25)
CALCIUM SERPL-MCNC: 9.5 MG/DL (ref 8.6–10.5)
CHLORIDE SERPL-SCNC: 104 MMOL/L (ref 98–107)
CHOLEST SERPL-MCNC: 175 MG/DL (ref 0–200)
CO2 SERPL-SCNC: 25.9 MMOL/L (ref 22–29)
CREAT SERPL-MCNC: 0.84 MG/DL (ref 0.57–1)
EGFRCR SERPLBLD CKD-EPI 2021: 77.2 ML/MIN/1.73
EOSINOPHIL # BLD AUTO: 0.16 10*3/MM3 (ref 0–0.4)
EOSINOPHIL NFR BLD AUTO: 3.4 % (ref 0.3–6.2)
ERYTHROCYTE [DISTWIDTH] IN BLOOD BY AUTOMATED COUNT: 12.6 % (ref 12.3–15.4)
GLOBULIN SER CALC-MCNC: 2 GM/DL
GLUCOSE SERPL-MCNC: 88 MG/DL (ref 65–99)
HCT VFR BLD AUTO: 38.2 % (ref 34–46.6)
HDLC SERPL-MCNC: 65 MG/DL (ref 40–60)
HGB BLD-MCNC: 12.6 G/DL (ref 12–15.9)
IMM GRANULOCYTES # BLD AUTO: 0.01 10*3/MM3 (ref 0–0.05)
IMM GRANULOCYTES NFR BLD AUTO: 0.2 % (ref 0–0.5)
LDLC SERPL CALC-MCNC: 100 MG/DL (ref 0–100)
LYMPHOCYTES # BLD AUTO: 1.63 10*3/MM3 (ref 0.7–3.1)
LYMPHOCYTES NFR BLD AUTO: 34.4 % (ref 19.6–45.3)
MCH RBC QN AUTO: 31.3 PG (ref 26.6–33)
MCHC RBC AUTO-ENTMCNC: 33 G/DL (ref 31.5–35.7)
MCV RBC AUTO: 95 FL (ref 79–97)
MONOCYTES # BLD AUTO: 0.49 10*3/MM3 (ref 0.1–0.9)
MONOCYTES NFR BLD AUTO: 10.3 % (ref 5–12)
NEUTROPHILS # BLD AUTO: 2.38 10*3/MM3 (ref 1.7–7)
NEUTROPHILS NFR BLD AUTO: 50.2 % (ref 42.7–76)
NRBC BLD AUTO-RTO: 0 /100 WBC (ref 0–0.2)
PLATELET # BLD AUTO: 254 10*3/MM3 (ref 140–450)
POTASSIUM SERPL-SCNC: 4 MMOL/L (ref 3.5–5.2)
PROT SERPL-MCNC: 6.4 G/DL (ref 6–8.5)
RBC # BLD AUTO: 4.02 10*6/MM3 (ref 3.77–5.28)
SODIUM SERPL-SCNC: 137 MMOL/L (ref 136–145)
TRIGL SERPL-MCNC: 50 MG/DL (ref 0–150)
VIT B12 SERPL-MCNC: 1128 PG/ML (ref 211–946)
VLDLC SERPL CALC-MCNC: 10 MG/DL (ref 5–40)
WBC # BLD AUTO: 4.74 10*3/MM3 (ref 3.4–10.8)

## 2023-04-13 DIAGNOSIS — M54.2 NECK PAIN: Primary | ICD-10-CM

## 2023-04-17 ENCOUNTER — TELEPHONE (OUTPATIENT)
Dept: FAMILY MEDICINE CLINIC | Facility: CLINIC | Age: 63
End: 2023-04-17

## 2023-04-17 NOTE — TELEPHONE ENCOUNTER
Caller: Frankel, Jackie    Relationship: Self    Best call back number: [phone]     What is the best time to reach you: ANYTIME     What was the call regarding: PATIENT STATES INFORMATION FOR INSURANCE HAS BEEN SENDING AS  AS HER PRIMARY INSURANCE INSTEAD OF [employer]. PATIENT STATES THIS HAS HAPPENED WITH HER PHYSICAL THERAPIST, [facility], AND POSSIBLY WITH HER X-RAYS. PATIENT WOULD LIKE TO KNOW WHY  IS BEING USED AS HER PRIMARY WHEN [employer] IS.     Do you require a callback: YES

## 2023-05-26 ENCOUNTER — OFFICE VISIT (OUTPATIENT)
Dept: FAMILY MEDICINE CLINIC | Facility: CLINIC | Age: 63
End: 2023-05-26
Payer: MEDICARE

## 2023-05-26 VITALS
WEIGHT: 144.4 LBS | SYSTOLIC BLOOD PRESSURE: 112 MMHG | TEMPERATURE: 98.6 F | HEART RATE: 102 BPM | BODY MASS INDEX: 28.35 KG/M2 | OXYGEN SATURATION: 98 % | HEIGHT: 60 IN | DIASTOLIC BLOOD PRESSURE: 64 MMHG

## 2023-05-26 DIAGNOSIS — B07.9 VERRUCOUS LESION OF SKIN: Primary | ICD-10-CM

## 2023-05-26 NOTE — PROGRESS NOTES
"Chief Complaint  spot on shoulder  (Left shoulder )    Subjective        Jackie Frankel presents to [facility]  History of Present Illness  Noticed spot on left shoulder over the past month.  No symptoms       Objective   Vital Signs:  /64   Pulse 102   Temp 98.6 °F (37 °C)   Ht 152.4 cm (60\")   Wt 65.5 kg (144 lb 6.4 oz)   SpO2 98%   BMI 28.20 kg/m²   Estimated body mass index is 28.2 kg/m² as calculated from the following:    Height as of this encounter: 152.4 cm (60\").    Weight as of this encounter: 65.5 kg (144 lb 6.4 oz).             Physical Exam  Constitutional:       General: She is not in acute distress.     Appearance: Normal appearance. She is well-developed.   HENT:      Head: Normocephalic and atraumatic.      Right Ear: External ear normal.      Left Ear: External ear normal.   Eyes:      Conjunctiva/sclera: Conjunctivae normal.      Pupils: Pupils are equal, round, and reactive to light.   Neck:      Thyroid: No thyromegaly.   Pulmonary:      Effort: Pulmonary effort is normal.   Skin:     Findings: Lesion present.      Comments: 4 mm erythematous verrucous lesion left chest above clavicle   Neurological:      Mental Status: She is alert and oriented to person, place, and time.   Psychiatric:         Mood and Affect: Mood normal.         Behavior: Behavior normal.        Result Review :            Cryotherapy, Skin Lesion    Date/Time: 5/26/2023 2:26 PM  Performed by: Kehrer, Meredith Lea, MD  Authorized by: Kehrer, Meredith Lea, MD   Preparation: Patient was prepped and draped in the usual sterile fashion.  Local anesthesia used: no    Anesthesia:  Local anesthesia used: no    Sedation:  Patient sedated: no    Patient tolerance: patient tolerated the procedure well with no immediate complications  Comments: Lesion left clavicle treated with histofreeze in a freeze-thaw-freeze method to 2 mm border. Patinet tolerated well.             Assessment and Plan "   Diagnoses and all orders for this visit:    1. Verrucous lesion of skin (Primary)    Other orders  -     Cryotherapy, Skin Lesion    Follow-up as needed.  Patient given wound care instructions         Follow Up   No follow-ups on file.  Patient was given instructions and counseling regarding her condition or for health maintenance advice. Please see specific information pulled into the AVS if appropriate.       Answers for HPI/ROS submitted by the patient on 5/25/2023  Please describe your symptoms.: Small weird growth on left shoulder blade.  Have you had these symptoms before?: No  How long have you been having these symptoms?: 1-2 weeks  What is the primary reason for your visit?: Other

## 2023-06-10 DIAGNOSIS — K21.9 GASTROESOPHAGEAL REFLUX DISEASE WITHOUT ESOPHAGITIS: ICD-10-CM

## 2023-06-12 RX ORDER — PANTOPRAZOLE SODIUM 40 MG/1
TABLET, DELAYED RELEASE ORAL
Qty: 180 TABLET | Refills: 3 | Status: SHIPPED | OUTPATIENT
Start: 2023-06-12

## 2023-09-18 DIAGNOSIS — B00.9 HSV INFECTION: ICD-10-CM

## 2023-09-18 RX ORDER — VALACYCLOVIR HYDROCHLORIDE 500 MG/1
TABLET, FILM COATED ORAL
Qty: 90 TABLET | Refills: 3 | Status: SHIPPED | OUTPATIENT
Start: 2023-09-18

## 2023-09-18 RX ORDER — IBUPROFEN 800 MG/1
TABLET ORAL
Qty: 90 TABLET | Refills: 1 | Status: SHIPPED | OUTPATIENT
Start: 2023-09-18

## 2023-10-10 DIAGNOSIS — Z78.0 MENOPAUSE: ICD-10-CM

## 2023-10-10 DIAGNOSIS — Z12.31 VISIT FOR SCREENING MAMMOGRAM: ICD-10-CM

## 2024-01-15 ENCOUNTER — OFFICE VISIT (OUTPATIENT)
Dept: FAMILY MEDICINE CLINIC | Facility: CLINIC | Age: 64
End: 2024-01-15
Payer: MEDICARE

## 2024-01-15 VITALS
SYSTOLIC BLOOD PRESSURE: 120 MMHG | HEIGHT: 60 IN | DIASTOLIC BLOOD PRESSURE: 68 MMHG | OXYGEN SATURATION: 99 % | TEMPERATURE: 98 F | HEART RATE: 68 BPM | BODY MASS INDEX: 29.33 KG/M2 | WEIGHT: 149.4 LBS

## 2024-01-15 DIAGNOSIS — Z00.00 MEDICARE ANNUAL WELLNESS VISIT, SUBSEQUENT: Primary | ICD-10-CM

## 2024-01-15 DIAGNOSIS — E55.9 VITAMIN D DEFICIENCY, UNSPECIFIED: ICD-10-CM

## 2024-01-15 DIAGNOSIS — E78.5 HYPERLIPIDEMIA, UNSPECIFIED HYPERLIPIDEMIA TYPE: ICD-10-CM

## 2024-01-15 DIAGNOSIS — Z91.81 AT MODERATE RISK FOR FALL: ICD-10-CM

## 2024-01-15 DIAGNOSIS — M25.551 PAIN OF RIGHT HIP: ICD-10-CM

## 2024-01-15 DIAGNOSIS — E89.0 POSTABLATIVE HYPOTHYROIDISM: ICD-10-CM

## 2024-01-15 DIAGNOSIS — K21.9 GASTROESOPHAGEAL REFLUX DISEASE WITHOUT ESOPHAGITIS: ICD-10-CM

## 2024-01-15 DIAGNOSIS — Z79.899 CURRENT USE OF PROTON PUMP INHIBITOR: ICD-10-CM

## 2024-01-15 PROCEDURE — 1160F RVW MEDS BY RX/DR IN RCRD: CPT | Performed by: FAMILY MEDICINE

## 2024-01-15 PROCEDURE — 1170F FXNL STATUS ASSESSED: CPT | Performed by: FAMILY MEDICINE

## 2024-01-15 PROCEDURE — 1159F MED LIST DOCD IN RCRD: CPT | Performed by: FAMILY MEDICINE

## 2024-01-15 PROCEDURE — G0439 PPPS, SUBSEQ VISIT: HCPCS | Performed by: FAMILY MEDICINE

## 2024-01-15 PROCEDURE — 99213 OFFICE O/P EST LOW 20 MIN: CPT | Performed by: FAMILY MEDICINE

## 2024-01-15 PROCEDURE — 96160 PT-FOCUSED HLTH RISK ASSMT: CPT | Performed by: FAMILY MEDICINE

## 2024-01-15 RX ORDER — PANTOPRAZOLE SODIUM 40 MG/1
40 TABLET, DELAYED RELEASE ORAL DAILY
Qty: 90 TABLET | Refills: 3 | Status: SHIPPED | OUTPATIENT
Start: 2024-01-15

## 2024-01-15 NOTE — PATIENT INSTRUCTIONS
Medicare Wellness  Personal Prevention Plan of Service     Date of Office Visit:    Encounter Provider:  Meredith Lea Kehrer, MD  Place of Service:  [facility]  Patient Name: Jackie Frankel  :  1960    As part of the Medicare Wellness portion of your visit today, we are providing you with this personalized preventive plan of services (PPPS). This plan is based upon recommendations of the [place] Preventive Services Task Force (USPSTF) and the Advisory Committee on Immunization Practices (ACIP).    This lists the preventive care services that should be considered, and provides dates of when you are due. Items listed as completed are up-to-date and do not require any further intervention.    Health Maintenance   Topic Date Due    LIPID PANEL  2024    BMI FOLLOWUP  2024    PAP SMEAR  10/06/2024    ANNUAL WELLNESS VISIT  01/15/2025    MAMMOGRAM  10/09/2025    DXA SCAN  10/09/2025    COLORECTAL CANCER SCREENING  2029    TDAP/TD VACCINES (2 - Td or Tdap) 10/06/2031    HEPATITIS C SCREENING  Completed    COVID-19 Vaccine  Completed    INFLUENZA VACCINE  Completed    Pneumococcal Vaccine 65+  Completed    ZOSTER VACCINE  Completed       Orders Placed This Encounter   Procedures    Vitamin D,25-Hydroxy     Order Specific Question:   Release to patient     Answer:   Routine Release [[id number]]    Vitamin B12     Order Specific Question:   Release to patient     Answer:   Routine Release [[id number]]    Comprehensive Metabolic Panel     Order Specific Question:   Release to patient     Answer:   Routine Release [[id number]]    Lipid Panel     Order Specific Question:   Release to patient     Answer:   Routine Release [[id number]]    TSH     Order Specific Question:   Release to patient     Answer:   Routine Release [[id number]]    CBC & Differential     Order Specific Question:   Manual Differential     Answer:   No     Order Specific Question:   Release to  patient     Answer:   Routine Release [[id number]]       Return in about 6 months (around 7/15/2024) for Recheck Thyroid.        Increase fiber to 3 times daily.    Fall Prevention in the Home, Adult  Falls can cause injuries and affect people of all ages. There are many simple things that you can do to make your home safe and to help prevent falls. Ask for help when making these changes, if needed.  What actions can I take to prevent falls?  General instructions  Use good lighting in all rooms. Replace any light bulbs that burn out, turn on lights if it is dark, and use night-lights.  Place frequently used items in easy-to-reach places. Lower the shelves around your home if necessary.  Set up furniture so that there are clear paths around it. Avoid moving your furniture around.  Remove throw rugs and other tripping hazards from the floor.  Avoid walking on wet floors.  Fix any uneven floor surfaces.  Add color or contrast paint or tape to grab bars and handrails in your home. Place contrasting color strips on the first and last steps of staircases.  When you use a stepladder, make sure that it is completely opened and that the sides and supports are firmly locked. Have someone hold the ladder while you are using it. Do not climb a closed stepladder.  Know where your pets are when moving through your home.  What can I do in the bathroom?         Keep the floor dry. Immediately clean up any water that is on the floor.  Remove soap buildup in the tub or shower regularly.  Use nonskid mats or decals on the floor of the tub or shower.  Attach bath mats securely with double-sided, nonslip rug tape.  If you need to sit down while you are in the shower, use a plastic, nonslip stool.  Install grab bars by the toilet and in the tub and shower. Do not use towel bars as grab bars.  What can I do in the bedroom?  Make sure that a bedside light is easy to reach.  Do not use oversized bedding that reaches the floor.  Have a firm  chair that has side arms to use for getting dressed.  What can I do in the kitchen?  Clean up any spills right away.  If you need to reach for something above you, use a sturdy step stool that has a grab bar.  Keep electrical cables out of the way.  Do not use floor polish or wax that makes floors slippery. If you must use wax, make sure that it is non-skid floor wax.  What can I do with my stairs?  Do not leave any items on the stairs.  Make sure that you have a light switch at the top and the bottom of the stairs. Have them installed if you do not have them.  Make sure that there are handrails on both sides of the stairs. Fix handrails that are broken or loose. Make sure that handrails are as long as the staircases.  Install non-slip stair treads on all stairs in your home.  Avoid having throw rugs at the top or bottom of stairs, or secure the rugs with carpet tape to prevent them from moving.  Choose a carpet design that does not hide the edge of steps on the stairs.  Check any carpeting to make sure that it is firmly attached to the stairs. Fix any carpet that is loose or worn.  What can I do on the outside of my home?  Use bright outdoor lighting.  Regularly repair the edges of walkways and driveways and fix any cracks.  Remove high doorway thresholds.  Trim any shrubbery on the main path into your home.  Regularly check that handrails are securely fastened and in good repair. Both sides of all steps should have handrails.  Install guardrails along the edges of any raised decks or porches.  Clear walkways of debris and clutter, including tools and rocks.  Have leaves, snow, and ice cleared regularly.  Use sand or salt on walkways during winter months.  In the garage, clean up any spills right away, including grease or oil spills.  What other actions can I take?  Wear closed-toe shoes that fit well and support your feet. Wear shoes that have rubber soles or low heels.  Use mobility aids as needed, such as canes,  walkers, scooters, and crutches.  Review your medicines with your health care provider. Some medicines can cause dizziness or changes in blood pressure, which increase your risk of falling.  Talk with your health care provider about other ways that you can decrease your risk of falls. This may include working with a physical therapist or  to improve your strength, balance, and endurance.  Where to find more information  Centers for Disease Control and Prevention, STEADI: www.cdc.gov  [place] on Aging: www.pamela.nih.gov  Contact a health care provider if:  You are afraid of falling at home.  You feel weak, drowsy, or dizzy at home.  You fall at home.  Summary  There are many simple things that you can do to make your home safe and to help prevent falls.  Ways to make your home safe include removing tripping hazards and installing grab bars in the bathroom.  Ask for help when making these changes in your home.  This information is not intended to replace advice given to you by your health care provider. Make sure you discuss any questions you have with your health care provider.  Document Revised: 09/19/2022 Document Reviewed: 07/21/2021  Elsevier Patient Education © 2023 [employer]

## 2024-01-15 NOTE — PROGRESS NOTES
The ABCs of the Annual Wellness Visit  Subsequent Medicare Wellness Visit    Subjective    Jackie Frankel is a 63 y.o. female who presents for a Subsequent Medicare Wellness Visit.    The following portions of the patient's history were reviewed and   updated as appropriate: allergies, current medications, past family history, past medical history, past social history, past surgical history, and problem list.    Compared to one year ago, the patient feels her physical   health is the same.    Compared to one year ago, the patient feels her mental   health is the same.    Recent Hospitalizations:  She was not admitted to the hospital during the last year.       Current Medical Providers:  Patient Care Team:  Kehrer, Meredith Lea, MD as PCP - General (Family Medicine)    Outpatient Medications Prior to Visit   Medication Sig Dispense Refill    ibuprofen (ADVIL,MOTRIN) 800 MG tablet TAKE ONE TABLET BY MOUTH EVERY 6 HOURS AS NEEDED FOR MILD PAIN 90 tablet 1    pantoprazole (PROTONIX) 40 MG EC tablet TAKE ONE TABLET BY MOUTH TWICE A DAY (Patient taking differently: Daily.) 180 tablet 3    Synthroid 100 MCG tablet Take 1 tablet by mouth Daily. 90 tablet 3    triamcinolone (KENALOG) 0.1 % cream Apply  topically to the appropriate area as directed 2 (Two) Times a Day. 80 g 2    valACYclovir (VALTREX) 500 MG tablet TAKE ONE TABLET BY MOUTH DAILY 90 tablet 3     No facility-administered medications prior to visit.       No opioid medication identified on active medication list. I have reviewed chart for other potential  high risk medication/s and harmful drug interactions in the elderly.        Aspirin is not on active medication list.  Aspirin use is not indicated based on review of current medical condition/s. Risk of harm outweighs potential benefits.  .    Patient Active Problem List   Diagnosis    Hypothyroidism    GERD (gastroesophageal reflux disease)    HSV infection    Acute cystitis with hematuria    Hyperlipidemia  "   Sleep disturbance    Eczema    Ischial pain, left     Advance Care Planning   Advance Care Planning     Advance Directive is not on file.  ACP discussion was held with the patient during this visit. Patient has an advance directive (not in EMR), copy requested.     Objective    Vitals:    01/15/24 0916   BP: 120/68   Pulse: 68   Temp: 98 °F (36.7 °C)   SpO2: 99%   Weight: 67.8 kg (149 lb 6.4 oz)   Height: 152.4 cm (60\")     Estimated body mass index is 29.18 kg/m² as calculated from the following:    Height as of this encounter: 152.4 cm (60\").    Weight as of this encounter: 67.8 kg (149 lb 6.4 oz).           Does the patient have evidence of cognitive impairment? No          HEALTH RISK ASSESSMENT    Smoking Status:  Social History     Tobacco Use   Smoking Status Never   Smokeless Tobacco Never     Alcohol Consumption:  Social History     Substance and Sexual Activity   Alcohol Use Not Currently    Comment: drinks about 5 times per year     Fall Risk Screen:    GEORGINA Fall Risk Assessment was completed, and patient is at MODERATE risk for falls. Assessment completed on:1/15/2024    Depression Screenin/15/2024     9:14 AM   PHQ-2/PHQ-9 Depression Screening   Little Interest or Pleasure in Doing Things 0-->not at all   Feeling Down, Depressed or Hopeless 0-->not at all   PHQ-9: Brief Depression Severity Measure Score 0       Health Habits and Functional and Cognitive Screenin/15/2024     9:14 AM   Functional & Cognitive Status   Do you have difficulty preparing food and eating? No   Do you have difficulty bathing yourself, getting dressed or grooming yourself? No   Do you have difficulty using the toilet? No   Do you have difficulty moving around from place to place? No   Do you have trouble with steps or getting out of a bed or a chair? No   Current Diet Well Balanced Diet   Dental Exam Up to date   Eye Exam Up to date   Exercise (times per week) 4 times per week   Current Exercises Include " Home Fitness Gym;House Cleaning;Home Exercise Program (TV, Computer, Etc.)   Do you need help using the phone?  No   Are you deaf or do you have serious difficulty hearing?  No   Do you need help to go to places out of walking distance? No   Do you need help shopping? No   Do you need help preparing meals?  No   Do you need help with housework?  No   Do you need help with laundry? No   Do you need help taking your medications? No   Do you need help managing money? No   Do you ever drive or ride in a car without wearing a seat belt? No       Age-appropriate Screening Schedule:  Refer to the list below for future screening recommendations based on patient's age, sex and/or medical conditions. Orders for these recommended tests are listed in the plan section. The patient has been provided with a written plan.    Health Maintenance   Topic Date Due    ANNUAL WELLNESS VISIT  04/11/2024    LIPID PANEL  04/11/2024    BMI FOLLOWUP  04/13/2024    PAP SMEAR  10/06/2024    MAMMOGRAM  10/09/2025    DXA SCAN  10/09/2025    COLORECTAL CANCER SCREENING  04/25/2029    TDAP/TD VACCINES (2 - Td or Tdap) 10/06/2031    HEPATITIS C SCREENING  Completed    COVID-19 Vaccine  Completed    INFLUENZA VACCINE  Completed    Pneumococcal Vaccine 65+  Completed    ZOSTER VACCINE  Completed                  CMS Preventative Services Quick Reference  Risk Factors Identified During Encounter  Fall risk  The above risks/problems have been discussed with the patient.  Pertinent information has been shared with the patient in the After Visit Summary.  An After Visit Summary and PPPS were made available to the patient.    Follow Up:   Next Medicare Wellness visit to be scheduled in 1 year.       Additional E&M Note during same encounter follows:  Patient has multiple medical problems which are significant and separately identifiable that require additional work above and beyond the Medicare Wellness Visit.      Chief Complaint  Medicare  "Wellness-subsequent, Hyperlipidemia, and Hypothyroidism    Subjective        HPI  Jackie Frankel is also being seen today for follow-up on GERD, hypothyroidism and other medical problems.  She is doing well in general.  She is compliant with her medication.  She is fasting for her labs.  Her only acute concern today is decreased activity because of pain in her right hip or knee when she gets down in a kneeling position.She still does yoga and is otherwise active and flexible.  She denies any back pain or numbness or tingling.         Objective   Vital Signs:  /68   Pulse 68   Temp 98 °F (36.7 °C)   Ht 152.4 cm (60\")   Wt 67.8 kg (149 lb 6.4 oz)   SpO2 99%   BMI 29.18 kg/m²     Physical Exam  Vitals and nursing note reviewed.   Constitutional:       General: She is not in acute distress.     Appearance: Normal appearance. She is well-developed.   HENT:      Head: Normocephalic and atraumatic.      Right Ear: Tympanic membrane, ear canal and external ear normal.      Left Ear: Tympanic membrane, ear canal and external ear normal.      Nose: Nose normal.      Mouth/Throat:      Mouth: Mucous membranes are moist.      Pharynx: Oropharynx is clear. No oropharyngeal exudate or posterior oropharyngeal erythema.   Eyes:      Conjunctiva/sclera: Conjunctivae normal.      Pupils: Pupils are equal, round, and reactive to light.   Neck:      Thyroid: No thyromegaly.   Cardiovascular:      Rate and Rhythm: Normal rate and regular rhythm.      Heart sounds: No murmur heard.  Pulmonary:      Effort: Pulmonary effort is normal.      Breath sounds: Normal breath sounds. No wheezing.   Abdominal:      General: Abdomen is flat. Bowel sounds are normal. There is no distension.      Palpations: Abdomen is soft. There is no mass.      Tenderness: There is no abdominal tenderness.      Hernia: No hernia is present.   Musculoskeletal:         General: No swelling or tenderness. Normal range of motion.      Cervical back: Normal " range of motion and neck supple.      Right lower leg: No edema.      Left lower leg: No edema.      Comments: Unable to elicit any point tenderness over the right hip knee or low back, full range of motion   Lymphadenopathy:      Cervical: No cervical adenopathy.   Skin:     General: Skin is warm and dry.      Capillary Refill: Capillary refill takes less than 2 seconds.      Findings: No rash.   Neurological:      General: No focal deficit present.      Mental Status: She is alert and oriented to person, place, and time.      Cranial Nerves: No cranial nerve deficit.   Psychiatric:         Mood and Affect: Mood normal.         Behavior: Behavior normal.                         Assessment and Plan   Diagnoses and all orders for this visit:    1. Medicare annual wellness visit, subsequent (Primary)    2. Hyperlipidemia, unspecified hyperlipidemia type  -     CBC & Differential  -     Comprehensive Metabolic Panel  -     Lipid Panel  -     TSH    3. Postablative hypothyroidism  -     TSH    4. Gastroesophageal reflux disease without esophagitis    5. Current use of proton pump inhibitor  -     Vitamin B12    6. Vitamin D deficiency, unspecified  -     Vitamin D,25-Hydroxy    7. At moderate risk for fall    8. Pain of right hip    Hyperlipidemia-due for recheck today  Hypothyroidism-due for TSH check  GERD-continue current medication  Health maintenance-up-to-date  Hip pain-uncertain etiology, reassured patient to stay active and keep her flexibility up.         Follow Up   No follow-ups on file.  Patient was given instructions and counseling regarding her condition or for health maintenance advice. Please see specific information pulled into the AVS if appropriate.

## 2024-01-16 LAB
25(OH)D3+25(OH)D2 SERPL-MCNC: 48.3 NG/ML (ref 30–100)
ALBUMIN SERPL-MCNC: 4.3 G/DL (ref 3.5–5.2)
ALBUMIN/GLOB SERPL: 2.2 G/DL
ALP SERPL-CCNC: 74 U/L (ref 39–117)
ALT SERPL-CCNC: 30 U/L (ref 1–33)
AST SERPL-CCNC: 31 U/L (ref 1–32)
BASOPHILS # BLD AUTO: 0.05 10*3/MM3 (ref 0–0.2)
BASOPHILS NFR BLD AUTO: 1 % (ref 0–1.5)
BILIRUB SERPL-MCNC: 0.6 MG/DL (ref 0–1.2)
BUN SERPL-MCNC: 28 MG/DL (ref 8–23)
BUN/CREAT SERPL: 38.4 (ref 7–25)
CALCIUM SERPL-MCNC: 9.5 MG/DL (ref 8.6–10.5)
CHLORIDE SERPL-SCNC: 103 MMOL/L (ref 98–107)
CHOLEST SERPL-MCNC: 175 MG/DL (ref 0–200)
CO2 SERPL-SCNC: 23.1 MMOL/L (ref 22–29)
CREAT SERPL-MCNC: 0.73 MG/DL (ref 0.57–1)
EGFRCR SERPLBLD CKD-EPI 2021: 91.4 ML/MIN/1.73
EOSINOPHIL # BLD AUTO: 0.08 10*3/MM3 (ref 0–0.4)
EOSINOPHIL NFR BLD AUTO: 1.7 % (ref 0.3–6.2)
ERYTHROCYTE [DISTWIDTH] IN BLOOD BY AUTOMATED COUNT: 12.6 % (ref 12.3–15.4)
GLOBULIN SER CALC-MCNC: 2 GM/DL
GLUCOSE SERPL-MCNC: 88 MG/DL (ref 65–99)
HCT VFR BLD AUTO: 36.1 % (ref 34–46.6)
HDLC SERPL-MCNC: 69 MG/DL (ref 40–60)
HGB BLD-MCNC: 12.1 G/DL (ref 12–15.9)
IMM GRANULOCYTES # BLD AUTO: 0.01 10*3/MM3 (ref 0–0.05)
IMM GRANULOCYTES NFR BLD AUTO: 0.2 % (ref 0–0.5)
LDLC SERPL CALC-MCNC: 97 MG/DL (ref 0–100)
LYMPHOCYTES # BLD AUTO: 1.6 10*3/MM3 (ref 0.7–3.1)
LYMPHOCYTES NFR BLD AUTO: 33.1 % (ref 19.6–45.3)
MCH RBC QN AUTO: 31.6 PG (ref 26.6–33)
MCHC RBC AUTO-ENTMCNC: 33.5 G/DL (ref 31.5–35.7)
MCV RBC AUTO: 94.3 FL (ref 79–97)
MONOCYTES # BLD AUTO: 0.53 10*3/MM3 (ref 0.1–0.9)
MONOCYTES NFR BLD AUTO: 11 % (ref 5–12)
NEUTROPHILS # BLD AUTO: 2.57 10*3/MM3 (ref 1.7–7)
NEUTROPHILS NFR BLD AUTO: 53 % (ref 42.7–76)
NRBC BLD AUTO-RTO: 0 /100 WBC (ref 0–0.2)
PLATELET # BLD AUTO: 245 10*3/MM3 (ref 140–450)
POTASSIUM SERPL-SCNC: 4.3 MMOL/L (ref 3.5–5.2)
PROT SERPL-MCNC: 6.3 G/DL (ref 6–8.5)
RBC # BLD AUTO: 3.83 10*6/MM3 (ref 3.77–5.28)
SODIUM SERPL-SCNC: 136 MMOL/L (ref 136–145)
TRIGL SERPL-MCNC: 41 MG/DL (ref 0–150)
TSH SERPL DL<=0.005 MIU/L-ACNC: 0.45 UIU/ML (ref 0.27–4.2)
VIT B12 SERPL-MCNC: 1178 PG/ML (ref 211–946)
VLDLC SERPL CALC-MCNC: 9 MG/DL (ref 5–40)
WBC # BLD AUTO: 4.84 10*3/MM3 (ref 3.4–10.8)

## 2024-01-22 ENCOUNTER — TELEPHONE (OUTPATIENT)
Dept: FAMILY MEDICINE CLINIC | Facility: CLINIC | Age: 64
End: 2024-01-22
Payer: OTHER GOVERNMENT

## 2024-01-22 DIAGNOSIS — Z12.11 COLON CANCER SCREENING: Primary | ICD-10-CM

## 2024-01-22 NOTE — TELEPHONE ENCOUNTER
----- Message from Mariella De Luna MA sent at 1/22/2024  1:01 PM EST -----  SPOKE WITH PATIENT SHE PREFERS [place]  ----- Message -----  From: Kehrer, Meredith Lea, MD  Sent: 1/22/2024  12:59 PM EST  To: Mariella De Luna MA    Per the records from [facility], patient is due for colonoscopy April of this year.  Does she want to continue to have that in [place]?  Or does she want to start getting them done through [facility]?

## 2024-03-18 DIAGNOSIS — E89.0 POSTABLATIVE HYPOTHYROIDISM: ICD-10-CM

## 2024-03-18 RX ORDER — LEVOTHYROXINE SODIUM 100 MCG
100 TABLET ORAL DAILY
Qty: 90 TABLET | Refills: 3 | Status: SHIPPED | OUTPATIENT
Start: 2024-03-18

## 2024-04-08 ENCOUNTER — OFFICE VISIT (OUTPATIENT)
Dept: FAMILY MEDICINE CLINIC | Facility: CLINIC | Age: 64
End: 2024-04-08
Payer: MEDICARE

## 2024-04-08 VITALS
TEMPERATURE: 97.7 F | WEIGHT: 150.2 LBS | OXYGEN SATURATION: 100 % | HEART RATE: 58 BPM | DIASTOLIC BLOOD PRESSURE: 58 MMHG | HEIGHT: 60 IN | SYSTOLIC BLOOD PRESSURE: 124 MMHG | BODY MASS INDEX: 29.49 KG/M2

## 2024-04-08 DIAGNOSIS — N39.0 URINARY TRACT INFECTION WITH HEMATURIA, SITE UNSPECIFIED: Primary | ICD-10-CM

## 2024-04-08 DIAGNOSIS — R35.0 FREQUENT URINATION: ICD-10-CM

## 2024-04-08 DIAGNOSIS — R31.9 URINARY TRACT INFECTION WITH HEMATURIA, SITE UNSPECIFIED: Primary | ICD-10-CM

## 2024-04-08 LAB
BILIRUB BLD-MCNC: NEGATIVE MG/DL
EXPIRATION DATE: ABNORMAL
GLUCOSE UR STRIP-MCNC: NEGATIVE MG/DL
KETONES UR QL: NEGATIVE
LEUKOCYTE EST, POC: ABNORMAL
Lab: ABNORMAL
NITRITE UR-MCNC: NEGATIVE MG/ML
PH UR: 6 [PH] (ref 5–8)
PROT UR STRIP-MCNC: ABNORMAL MG/DL
RBC # UR STRIP: ABNORMAL /UL
SP GR UR: 1.01 (ref 1–1.03)
UROBILINOGEN UR QL: NORMAL

## 2024-04-08 PROCEDURE — 1160F RVW MEDS BY RX/DR IN RCRD: CPT | Performed by: FAMILY MEDICINE

## 2024-04-08 PROCEDURE — 1159F MED LIST DOCD IN RCRD: CPT | Performed by: FAMILY MEDICINE

## 2024-04-08 PROCEDURE — 81003 URINALYSIS AUTO W/O SCOPE: CPT | Performed by: FAMILY MEDICINE

## 2024-04-08 PROCEDURE — 99213 OFFICE O/P EST LOW 20 MIN: CPT | Performed by: FAMILY MEDICINE

## 2024-04-08 RX ORDER — NITROFURANTOIN 25; 75 MG/1; MG/1
100 CAPSULE ORAL 2 TIMES DAILY
Qty: 14 CAPSULE | Refills: 0 | Status: SHIPPED | OUTPATIENT
Start: 2024-04-08

## 2024-04-08 NOTE — PROGRESS NOTES
"Chief Complaint  Urinary Frequency    Subjective        Jackie Frankel presents to [facility]  History of Present Illness  Patient of Dr. Kehrer's is here today with a new problem.  She started yesterday with urinary frequency and burning at the end of her stream.  She has had a urinary tract infection many years ago and it feels similar to that.  She denies any back pain or fever.  Her symptoms do seem a little bit better today.      Objective   Vital Signs:  /58   Pulse 58   Temp 97.7 °F (36.5 °C) (Temporal)   Ht 152.4 cm (60\")   Wt 68.1 kg (150 lb 3.2 oz)   SpO2 100%   BMI 29.33 kg/m²   Estimated body mass index is 29.33 kg/m² as calculated from the following:    Height as of this encounter: 152.4 cm (60\").    Weight as of this encounter: 68.1 kg (150 lb 3.2 oz).         Physical Exam  Vitals and nursing note reviewed.   Constitutional:       Appearance: She is well-developed.   HENT:      Head: Normocephalic and atraumatic.   Eyes:      General: No scleral icterus.     Conjunctiva/sclera: Conjunctivae normal.   Pulmonary:      Effort: Pulmonary effort is normal.   Abdominal:      General: Bowel sounds are normal.      Palpations: Abdomen is soft.      Tenderness: There is no right CVA tenderness or left CVA tenderness.   Musculoskeletal:         General: Normal range of motion.      Cervical back: Normal range of motion and neck supple.   Skin:     General: Skin is warm and dry.      Capillary Refill: Capillary refill takes less than 2 seconds.      Findings: No rash.   Neurological:      Mental Status: She is alert and oriented to person, place, and time.   Psychiatric:         Mood and Affect: Mood normal.         Behavior: Behavior normal.         Thought Content: Thought content normal.         Judgment: Judgment normal.        Result Review :    The following data was reviewed by: Catina Ortega DO on 04/08/2024:  Common labs          1/15/2024    10:05   Common " Labs   Glucose 88    BUN 28    Creatinine 0.73    Sodium 136    Potassium 4.3    Chloride 103    Calcium 9.5    Total Protein 6.3    Albumin 4.3    Total Bilirubin 0.6    Alkaline Phosphatase 74    AST (SGOT) 31    ALT (SGPT) 30    WBC 4.84    Hemoglobin 12.1    Hematocrit 36.1    Platelets 245    Total Cholesterol 175    Triglycerides 41    HDL Cholesterol 69    LDL Cholesterol  97      UA          4/8/2024    15:04   Urinalysis   Ketones, UA Negative    Leukocytes, UA Large (3+)                     Assessment and Plan     Diagnoses and all orders for this visit:    1. Urinary tract infection with hematuria, site unspecified (Primary)  -     nitrofurantoin, macrocrystal-monohydrate, (Macrobid) 100 MG capsule; Take 1 capsule by mouth 2 (Two) Times a Day.  Dispense: 14 capsule; Refill: 0    2. Frequent urination  -     POC Urinalysis Dipstick, Automated    Patient of Dr. Kehrer's is here today for an acute urinary tract infection.  Urinalysis shows protein, blood, and leukocytes.  Urine culture will be sent.  Patient's kidney function was normal on labs from January 2024.  A prescription for Macrobid was provided.  Any changes in the antibiotic will be made based on culture results.  If the patient's symptoms do not improve she was advised to follow-up.         Follow Up     Return if symptoms worsen or fail to improve.  Patient was given instructions and counseling regarding her condition or for health maintenance advice. Please see specific information pulled into the AVS if appropriate.

## 2024-04-11 LAB
BACTERIA UR CULT: ABNORMAL
BACTERIA UR CULT: ABNORMAL
OTHER ANTIBIOTIC SUSC ISLT: ABNORMAL

## 2024-04-25 ENCOUNTER — TELEPHONE (OUTPATIENT)
Dept: FAMILY MEDICINE CLINIC | Facility: CLINIC | Age: 64
End: 2024-04-25

## 2024-04-25 NOTE — TELEPHONE ENCOUNTER
Caller: Frankel, Jackie    Relationship: Self    Best call back number: [phone]    What is the best time to reach you: ANYTIME    Who are you requesting to speak with (clinical staff, provider,  specific staff member): CLINICAL    What was the call regarding: PATIENT SAW DR MCNEIL ON 4/8/24 AND WAS DIAGNOSED WITH A UTI AND WAS PRESCRIBED nitrofurantoin, macrocrystal-monohydrate, (Macrobid) 100 MG capsule AND HAS TAKEN ALL THE MEDICATION AS OF 4/15/24. PATIENT STATED THAT AS OF NOW 4/25/24 SHE HAS FREQUENT URINATION AND ACHING. PATIENT IS WANTING TO KNOW IF ANOTHER ROUND OF MEDICATION CAN BE CALLED IN OR IF SHE NEEDS TO COME IN FOR ANOTHER URINALYSIS. PLEASE ADVISE.    PREFERRED PHARMACY:[facility] [id number] - [address] - [phone]  - [phone]

## 2024-04-25 NOTE — TELEPHONE ENCOUNTER
If she is still having symptoms she needs to be seen in the office.  Since Dr. Kehrer is the only provider here tomorrow, Dr. Kehrer said it is okay for her to drop off a UA for urinalysis and culture, rather than making an actual appointment with Dr. Kehrer.

## 2024-04-26 ENCOUNTER — LAB (OUTPATIENT)
Dept: FAMILY MEDICINE CLINIC | Facility: CLINIC | Age: 64
End: 2024-04-26
Payer: OTHER GOVERNMENT

## 2024-04-26 DIAGNOSIS — R31.9 URINARY TRACT INFECTION WITH HEMATURIA, SITE UNSPECIFIED: Primary | ICD-10-CM

## 2024-04-26 DIAGNOSIS — N39.0 URINARY TRACT INFECTION WITH HEMATURIA, SITE UNSPECIFIED: Primary | ICD-10-CM

## 2024-04-26 LAB
BILIRUB BLD-MCNC: ABNORMAL MG/DL
CLARITY, POC: CLEAR
COLOR UR: YELLOW
EXPIRATION DATE: ABNORMAL
GLUCOSE UR STRIP-MCNC: NEGATIVE MG/DL
KETONES UR QL: ABNORMAL
LEUKOCYTE EST, POC: ABNORMAL
Lab: ABNORMAL
NITRITE UR-MCNC: NEGATIVE MG/ML
PH UR: 6 [PH] (ref 5–8)
PROT UR STRIP-MCNC: NEGATIVE MG/DL
RBC # UR STRIP: NEGATIVE /UL
SP GR UR: 1.03 (ref 1–1.03)
UROBILINOGEN UR QL: NORMAL

## 2024-04-28 LAB
BACTERIA UR CULT: NORMAL
BACTERIA UR CULT: NORMAL

## 2024-07-15 ENCOUNTER — OFFICE VISIT (OUTPATIENT)
Dept: FAMILY MEDICINE CLINIC | Facility: CLINIC | Age: 64
End: 2024-07-15
Payer: MEDICARE

## 2024-07-15 VITALS
DIASTOLIC BLOOD PRESSURE: 68 MMHG | SYSTOLIC BLOOD PRESSURE: 122 MMHG | OXYGEN SATURATION: 98 % | HEART RATE: 78 BPM | BODY MASS INDEX: 29.25 KG/M2 | WEIGHT: 149 LBS | HEIGHT: 60 IN | TEMPERATURE: 97.7 F

## 2024-07-15 DIAGNOSIS — E89.0 POSTABLATIVE HYPOTHYROIDISM: Primary | ICD-10-CM
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
## 2024-07-15 DIAGNOSIS — R10.13 EPIGASTRIC PAIN: ICD-10-CM

## 2024-07-15 DIAGNOSIS — K21.9 GASTROESOPHAGEAL REFLUX DISEASE WITHOUT ESOPHAGITIS: ICD-10-CM

## 2024-07-15 PROCEDURE — 1126F AMNT PAIN NOTED NONE PRSNT: CPT | Performed by: FAMILY MEDICINE

## 2024-07-15 PROCEDURE — 99214 OFFICE O/P EST MOD 30 MIN: CPT | Performed by: FAMILY MEDICINE

## 2024-07-15 NOTE — PROGRESS NOTES
"Chief Complaint  Hypothyroidism (6 month recheck )    Subjective        Jackie Frankel presents to [facility]  History of Present Illness  History of Present Illness  The patient is a 64-year-old female who presents for routine 6-month follow-up on her hypothyroidism.    She is generally in good health and has been adhering to her daily thyroid medication regimen. She reports no symptoms of thyroid issues such as hair loss or palpitations.    She first noticed bumps on her right elbow during a recent vacation in [place]. She does not believe they are bedbugs, as her daughter brought home a bed bug in her suitcase. The bumps do not cause significant itching.    During a vacation to [place], she experienced a significant weight loss. However, she has since regained some weight over the summer. Over the past month, she has noticed a churning sensation in her epigastric area about 2 hours after eating, accompanied by burping. This sensation is not painful or burning. This occurred after eating a Greek salad with a Greek dressing for dinner last night. She does not measure her portions and expresses a desire to lose weight. She has not started walking since her return from [place], but plans to resume this evening. Her breakfast between 10:30 and 11 a.m. consists of a single-serve cups of Greek yogurt, a quarter of a cup of honey, granola, half or honeywat or whole wheat bagel, and hard-boiled eggs. She noticed a slight churning sensation around 1 p.m. after eating the hard-boiled eggs. She drinks 2 cups of coffee 30 minutes after taking her Synthroid in the morning. Her colonoscopy results were clear.   She is allergic to TREE NUTS.       Objective   Vital Signs:  /68   Pulse 78   Temp 97.7 °F (36.5 °C)   Ht 152.4 cm (60\")   Wt 67.6 kg (149 lb)   SpO2 98%   BMI 29.10 kg/m²   Estimated body mass index is 29.1 kg/m² as calculated from the following:    Height as of this " "encounter: 152.4 cm (60\").    Weight as of this encounter: 67.6 kg (149 lb).             Physical Exam   Physical Exam  Lungs are clear.  Heart rhythm is regular.  Abdomen is soft with normal bowel sounds, nontender.  Extremities are without edema.  Erythematous nodules noted on the bilateral arms, neck, and side of the face, resembling bug bites.       Result Review :          Results                Assessment and Plan     Diagnoses and all orders for this visit:    1. Postablative hypothyroidism (Primary)  -     TSH    2. Multiple insect bites    3. Gastroesophageal reflux disease without esophagitis    4. Epigastric pain  -     Comprehensive Metabolic Panel  -     CBC & Differential  -     Lipase      Assessment & Plan  1. Insect bites.  The erythematous nodules on her right elbow appear to be insect bites. She was advised to apply eczema cream to the affected areas if they cause discomfort.    2. Hypothyroidism.  A TSH test is due.    3. Epigastric discomfort.  Potential hiatal hernia is suspected. Laboratory tests will be conducted to assess liver and pancreas function. She was advised to avoid greasy foods and to monitor her caloric intake for servings. Walking was encouraged, and she was advised to incorporate low-sugar ice cream into her diet. She was also advised to space out her lunch to enhance her glycemic index. Further workup will be conducted based on the lab results.            Follow Up     Return in about 6 months (around 1/15/2025) for Medicare Wellness.  Patient was given instructions and counseling regarding her condition or for health maintenance advice. Please see specific information pulled into the AVS if appropriate.    Patient or patient representative verbalized consent for the use of Ambient Listening during the visit with  Meredith Lea Kehrer, MD for chart documentation. 7/16/2024  13:45 EDT      Answers submitted by the patient for this visit:  Other (Submitted on 7/8/2024)  Please " describe your symptoms.: Regular every 6 months appointment to monitor thyroid condition.  Have you had these symptoms before?: Yes  How long have you been having these symptoms?: Greater than 2 weeks  Please list any medications you are currently taking for this condition.: Synthroid prescribed by Dr. Kehrer  Primary Reason for Visit (Submitted on 7/8/2024)  What is the primary reason for your visit?: Other

## 2024-07-16 LAB
ALBUMIN SERPL-MCNC: 4.1 G/DL (ref 3.9–4.9)
ALP SERPL-CCNC: 66 IU/L (ref 44–121)
ALT SERPL-CCNC: 28 IU/L (ref 0–32)
AST SERPL-CCNC: 34 IU/L (ref 0–40)
BASOPHILS # BLD AUTO: 0 X10E3/UL (ref 0–0.2)
BASOPHILS NFR BLD AUTO: 1 %
BILIRUB SERPL-MCNC: 0.5 MG/DL (ref 0–1.2)
BUN SERPL-MCNC: 22 MG/DL (ref 8–27)
BUN/CREAT SERPL: 26 (ref 12–28)
CALCIUM SERPL-MCNC: 9 MG/DL (ref 8.7–10.3)
CHLORIDE SERPL-SCNC: 100 MMOL/L (ref 96–106)
CO2 SERPL-SCNC: 23 MMOL/L (ref 20–29)
CREAT SERPL-MCNC: 0.86 MG/DL (ref 0.57–1)
EGFRCR SERPLBLD CKD-EPI 2021: 74 ML/MIN/1.73
EOSINOPHIL # BLD AUTO: 0 X10E3/UL (ref 0–0.4)
EOSINOPHIL NFR BLD AUTO: 1 %
ERYTHROCYTE [DISTWIDTH] IN BLOOD BY AUTOMATED COUNT: 12.3 % (ref 11.7–15.4)
GLOBULIN SER CALC-MCNC: 2.2 G/DL (ref 1.5–4.5)
GLUCOSE SERPL-MCNC: 102 MG/DL (ref 70–99)
HCT VFR BLD AUTO: 36.9 % (ref 34–46.6)
HGB BLD-MCNC: 12 G/DL (ref 11.1–15.9)
IMM GRANULOCYTES # BLD AUTO: 0 X10E3/UL (ref 0–0.1)
IMM GRANULOCYTES NFR BLD AUTO: 0 %
LIPASE SERPL-CCNC: 42 U/L (ref 14–72)
LYMPHOCYTES # BLD AUTO: 1.4 X10E3/UL (ref 0.7–3.1)
LYMPHOCYTES NFR BLD AUTO: 27 %
MCH RBC QN AUTO: 30.8 PG (ref 26.6–33)
MCHC RBC AUTO-ENTMCNC: 32.5 G/DL (ref 31.5–35.7)
MCV RBC AUTO: 95 FL (ref 79–97)
MONOCYTES # BLD AUTO: 0.5 X10E3/UL (ref 0.1–0.9)
MONOCYTES NFR BLD AUTO: 10 %
NEUTROPHILS # BLD AUTO: 3.1 X10E3/UL (ref 1.4–7)
NEUTROPHILS NFR BLD AUTO: 61 %
PLATELET # BLD AUTO: 244 X10E3/UL (ref 150–450)
POTASSIUM SERPL-SCNC: 4.5 MMOL/L (ref 3.5–5.2)
PROT SERPL-MCNC: 6.3 G/DL (ref 6–8.5)
RBC # BLD AUTO: 3.89 X10E6/UL (ref 3.77–5.28)
SODIUM SERPL-SCNC: 136 MMOL/L (ref 134–144)
TSH SERPL DL<=0.005 MIU/L-ACNC: 0.75 UIU/ML (ref 0.45–4.5)
WBC # BLD AUTO: 5.1 X10E3/UL (ref 3.4–10.8)

## 2024-08-01 ENCOUNTER — TELEPHONE (OUTPATIENT)
Dept: FAMILY MEDICINE CLINIC | Facility: CLINIC | Age: 64
End: 2024-08-01

## 2024-08-01 ENCOUNTER — OFFICE VISIT (OUTPATIENT)
Dept: FAMILY MEDICINE CLINIC | Facility: CLINIC | Age: 64
End: 2024-08-01
Payer: MEDICARE

## 2024-08-01 VITALS
SYSTOLIC BLOOD PRESSURE: 118 MMHG | HEART RATE: 62 BPM | TEMPERATURE: 98.5 F | WEIGHT: 148.2 LBS | OXYGEN SATURATION: 98 % | HEIGHT: 60 IN | DIASTOLIC BLOOD PRESSURE: 76 MMHG | BODY MASS INDEX: 29.09 KG/M2

## 2024-08-01 DIAGNOSIS — U07.1 COVID-19 VIRUS INFECTION: Primary | ICD-10-CM

## 2024-08-01 DIAGNOSIS — R05.1 ACUTE COUGH: ICD-10-CM

## 2024-08-01 LAB
EXPIRATION DATE: ABNORMAL
EXPIRATION DATE: NORMAL
FLUAV AG NPH QL: NEGATIVE
FLUBV AG NPH QL: NEGATIVE
INTERNAL CONTROL: ABNORMAL
INTERNAL CONTROL: NORMAL
Lab: ABNORMAL
Lab: NORMAL
SARS-COV-2 AG UPPER RESP QL IA.RAPID: DETECTED

## 2024-08-01 PROCEDURE — 1126F AMNT PAIN NOTED NONE PRSNT: CPT | Performed by: FAMILY MEDICINE

## 2024-08-01 PROCEDURE — 87804 INFLUENZA ASSAY W/OPTIC: CPT | Performed by: FAMILY MEDICINE

## 2024-08-01 PROCEDURE — 87426 SARSCOV CORONAVIRUS AG IA: CPT | Performed by: FAMILY MEDICINE

## 2024-08-01 PROCEDURE — 99213 OFFICE O/P EST LOW 20 MIN: CPT | Performed by: FAMILY MEDICINE

## 2024-08-01 PROCEDURE — 1160F RVW MEDS BY RX/DR IN RCRD: CPT | Performed by: FAMILY MEDICINE

## 2024-08-01 PROCEDURE — 1159F MED LIST DOCD IN RCRD: CPT | Performed by: FAMILY MEDICINE

## 2024-08-01 NOTE — TELEPHONE ENCOUNTER
Caller: Frankel, Jackie    Relationship to patient: Self    Best call back number: [phone]    Date of positive COVID19 test: N/A    Date of possible COVID19 exposure: WAS AT [employer] SUNDAY 7/29    COVID19 symptoms: 3 DAYS HEAD CONGESTION COUGH, LOW FEVER    Additional information or concerns: ENTIRE [employer] HAS CAME DOWN WITH COVID. THIS WEEK IS CANCELED DUE TO COVID     WOULD LIKE TO KNOW HOW SHE CAN TEST TO SEE IF SHE HAS COVID    What is the patients preferred pharmacy:   [facility] [id number] - [address] [facility] [address] - [phone]  - [phone]

## 2024-08-01 NOTE — PROGRESS NOTES
"Chief Complaint  Cough, Headache, and Nasal Congestion    Subjective        Jackie Frankel presents to [facility]  History of Present Illness  History of Present Illness  The patient is a 64-year-old female who presents for an acute concern.    She began experiencing a runny nose and congestion two days ago. Her symptoms were particularly severe yesterday, but have improved today. She attended [employer] on Sunday, where several members of the household tested positive for COVID-19. Her grandson, who was with her, has not exhibited any symptoms. She has not taken any over-the-counter medications, except for her 's cold medication. The sinus pressure is the most bothersome symptom for her. She has been drinking plenty of water. Her cough was minimal last night, but it disrupted her sleep. She has been using whiskey and lemon with honey for her cough. She has contracted COVID-19 twice in the past.    IMMUNIZATIONS  She is fully vaccinated for COVID-19.       Objective   Vital Signs:  /76   Pulse 62   Temp 98.5 °F (36.9 °C)   Ht 152.4 cm (60\")   Wt 67.2 kg (148 lb 3.2 oz)   SpO2 98%   BMI 28.94 kg/m²   Estimated body mass index is 28.94 kg/m² as calculated from the following:    Height as of this encounter: 152.4 cm (60\").    Weight as of this encounter: 67.2 kg (148 lb 3.2 oz).               Physical Exam   Physical Exam  Patient is alert, in no acute distress.  Clear postnasal drainage noted. Tympanic membranes are clear.  Neck is supple with shotty adenopathy.  Lungs are clear.  Heart rhythm is regular.       Result Review :          Results  Laboratory Studies  Covid test positive.              Assessment and Plan     Diagnoses and all orders for this visit:    1. COVID-19 virus infection (Primary)    2. Acute cough  -     POCT Influenza A/B  -     POCT SARS-CoV-2 Antigen JOSLYN      Assessment & Plan  1. COVID-19.  She presents with a mild case of COVID-19. She was " advised to self-isolate until her condition improves. She declined a prescription nasal spray for congestion and Paxlovid. Recommendations included increased fluid intake, rest, and weight loss. She was informed that colds typically last 7 to 10 days, influenza and COVID-19 typically last 10 to 14 days, with the initial 4 to 5 days being the most severe. She declined a prescription cough medication. Should her condition worsen over the next few days, she should return for further evaluation. She was advised to seek immediate medical attention if she experiences severe symptoms such as chest pain or shortness of breath.            Follow Up     No follow-ups on file.  Patient was given instructions and counseling regarding her condition or for health maintenance advice. Please see specific information pulled into the AVS if appropriate.    Patient or patient representative verbalized consent for the use of Ambient Listening during the visit with  Meredith Lea Kehrer, MD for chart documentation. 8/1/2024  15:20 EDT      Answers submitted by the patient for this visit:  Other (Submitted on 8/1/2024)  Please describe your symptoms.: Nose congestion & slight cough  Have you had these symptoms before?: Yes  How long have you been having these symptoms?: 1-4 days  Please list any medications you are currently taking for this condition.: Cough & Cold HBP  Please describe any probable cause for these symptoms. : Exposed to COVID at [employer] this past Sunday  Primary Reason for Visit (Submitted on 8/1/2024)  What is the primary reason for your visit?: Other

## 2024-09-16 DIAGNOSIS — B00.9 HSV INFECTION: ICD-10-CM

## 2024-09-17 RX ORDER — VALACYCLOVIR HYDROCHLORIDE 500 MG/1
500 TABLET, FILM COATED ORAL DAILY
Qty: 90 TABLET | Refills: 3 | Status: SHIPPED | OUTPATIENT
Start: 2024-09-17

## 2025-01-16 ENCOUNTER — OFFICE VISIT (OUTPATIENT)
Dept: FAMILY MEDICINE CLINIC | Facility: CLINIC | Age: 65
End: 2025-01-16
Payer: MEDICARE

## 2025-01-16 VITALS
OXYGEN SATURATION: 99 % | SYSTOLIC BLOOD PRESSURE: 114 MMHG | WEIGHT: 147.2 LBS | DIASTOLIC BLOOD PRESSURE: 58 MMHG | HEIGHT: 60 IN | BODY MASS INDEX: 28.9 KG/M2 | TEMPERATURE: 98.3 F | HEART RATE: 60 BPM

## 2025-01-16 DIAGNOSIS — R73.09 ABNORMAL GLUCOSE: ICD-10-CM

## 2025-01-16 DIAGNOSIS — Z00.00 MEDICARE ANNUAL WELLNESS VISIT, SUBSEQUENT: Primary | ICD-10-CM

## 2025-01-16 DIAGNOSIS — E89.0 POSTABLATIVE HYPOTHYROIDISM: ICD-10-CM

## 2025-01-16 DIAGNOSIS — K21.9 GASTROESOPHAGEAL REFLUX DISEASE WITHOUT ESOPHAGITIS: ICD-10-CM

## 2025-01-16 DIAGNOSIS — Z91.81 AT MODERATE RISK FOR FALL: ICD-10-CM

## 2025-01-16 DIAGNOSIS — B00.9 HSV INFECTION: ICD-10-CM

## 2025-01-16 PROCEDURE — 1159F MED LIST DOCD IN RCRD: CPT | Performed by: FAMILY MEDICINE

## 2025-01-16 PROCEDURE — 96160 PT-FOCUSED HLTH RISK ASSMT: CPT | Performed by: FAMILY MEDICINE

## 2025-01-16 PROCEDURE — 1126F AMNT PAIN NOTED NONE PRSNT: CPT | Performed by: FAMILY MEDICINE

## 2025-01-16 PROCEDURE — 99214 OFFICE O/P EST MOD 30 MIN: CPT | Performed by: FAMILY MEDICINE

## 2025-01-16 PROCEDURE — G0439 PPPS, SUBSEQ VISIT: HCPCS | Performed by: FAMILY MEDICINE

## 2025-01-16 PROCEDURE — 1170F FXNL STATUS ASSESSED: CPT | Performed by: FAMILY MEDICINE

## 2025-01-16 PROCEDURE — 1160F RVW MEDS BY RX/DR IN RCRD: CPT | Performed by: FAMILY MEDICINE

## 2025-01-16 RX ORDER — PANTOPRAZOLE SODIUM 40 MG/1
40 TABLET, DELAYED RELEASE ORAL DAILY
Qty: 90 TABLET | Refills: 3 | Status: SHIPPED | OUTPATIENT
Start: 2025-01-16

## 2025-01-16 RX ORDER — VALACYCLOVIR HYDROCHLORIDE 500 MG/1
500 TABLET, FILM COATED ORAL DAILY
Qty: 90 TABLET | Refills: 3 | Status: SHIPPED | OUTPATIENT
Start: 2025-01-16

## 2025-01-16 RX ORDER — LEVOTHYROXINE SODIUM 100 MCG
100 TABLET ORAL DAILY
Qty: 90 TABLET | Refills: 3 | Status: SHIPPED | OUTPATIENT
Start: 2025-01-16

## 2025-01-16 NOTE — PROGRESS NOTES
The ABCs of the Annual Wellness Visit  Subsequent Medicare Wellness Visit    Subjective    Jackie Frankel is a 64 y.o. female who presents for a Subsequent Medicare Wellness Visit.    The following portions of the patient's history were reviewed and   updated as appropriate: allergies, current medications, past family history, past medical history, past social history, past surgical history, and problem list.    Compared to one year ago, the patient feels her physical   health is the same.    Compared to one year ago, the patient feels her mental   health is the same.    Recent Hospitalizations:  She was not admitted to the hospital during the last year.       Current Medical Providers:  Patient Care Team:  Kehrer, Meredith Lea, MD as PCP - General (Family Medicine)    Outpatient Medications Prior to Visit   Medication Sig Dispense Refill    ibuprofen (ADVIL,MOTRIN) 800 MG tablet TAKE ONE TABLET BY MOUTH EVERY 6 HOURS AS NEEDED FOR MILD PAIN 90 tablet 1    triamcinolone (KENALOG) 0.1 % cream Apply  topically to the appropriate area as directed 2 (Two) Times a Day. 80 g 2    pantoprazole (PROTONIX) 40 MG EC tablet Take 1 tablet by mouth Daily. 90 tablet 3    Synthroid 100 MCG tablet TAKE ONE TABLET BY MOUTH DAILY 90 tablet 3    valACYclovir (VALTREX) 500 MG tablet TAKE 1 TABLET BY MOUTH DAILY 90 tablet 3     No facility-administered medications prior to visit.       No opioid medication identified on active medication list. I have reviewed chart for other potential  high risk medication/s and harmful drug interactions in the elderly.        Aspirin is not on active medication list.  Aspirin use is not indicated based on review of current medical condition/s. Risk of harm outweighs potential benefits.  .    Patient Active Problem List   Diagnosis    Hypothyroidism    GERD (gastroesophageal reflux disease)    HSV infection    Acute cystitis with hematuria    Hyperlipidemia    Sleep disturbance    Eczema    Ischial  "pain, left     Advance Care Planning   Advance Care Planning     Advance Directive is not on file.  ACP discussion was held with the patient during this visit. Patient does not have an advance directive, information provided.     Objective    Vitals:    25 0927   BP: 114/58   Pulse: 60   Temp: 98.3 °F (36.8 °C)   SpO2: 99%   Weight: 66.8 kg (147 lb 3.2 oz)   Height: 152.4 cm (60\")     Estimated body mass index is 28.75 kg/m² as calculated from the following:    Height as of this encounter: 152.4 cm (60\").    Weight as of this encounter: 66.8 kg (147 lb 3.2 oz).           Does the patient have evidence of cognitive impairment? No          HEALTH RISK ASSESSMENT    Smoking Status:  Social History     Tobacco Use   Smoking Status Never   Smokeless Tobacco Never     Alcohol Consumption:  Social History     Substance and Sexual Activity   Alcohol Use Not Currently    Comment: drinks about 5 times per year     Fall Risk Screen:    [place] Fall Risk Assessment was completed, and patient is at MODERATE risk for falls. Assessment completed on:2025    Depression Screenin/16/2025     9:25 AM   PHQ-2/PHQ-9 Depression Screening   Little interest or pleasure in doing things Not at all   Feeling down, depressed, or hopeless Not at all   How difficult have these problems made it for you to do your work, take care of things at home, or get along with other people? Not difficult at all       Health Habits and Functional and Cognitive Screenin/16/2025     9:26 AM   Functional & Cognitive Status   Do you have difficulty preparing food and eating? No   Do you have difficulty bathing yourself, getting dressed or grooming yourself? No   Do you have difficulty using the toilet? No   Do you have difficulty moving around from place to place? No   Do you have trouble with steps or getting out of a bed or a chair? No   Current Diet Well Balanced Diet   Dental Exam Up to date   Eye Exam Up to date   Exercise (times " per week) 5 times per week   Current Exercises Include Walking;House Cleaning;Home Exercise Program (TV, Computer, Etc[employer]   Do you need help using the phone?  No   Are you deaf or do you have serious difficulty hearing?  Yes   Do you need help to go to places out of walking distance? No   Do you need help shopping? No   Do you need help preparing meals?  No   Do you need help with housework?  No   Do you need help with laundry? No   Do you need help taking your medications? No   Do you need help managing money? No   Do you ever drive or ride in a car without wearing a seat belt? No   Have you felt unusual stress, anger or loneliness in the last month? No   Who do you live with? Spouse   If you need help, do you have trouble finding someone available to you? No   Do you have difficulty concentrating, remembering or making decisions? No       Age-appropriate Screening Schedule:  Refer to the list below for future screening recommendations based on patient's age, sex and/or medical conditions. Orders for these recommended tests are listed in the plan section. The patient has been provided with a written plan.    Health Maintenance   Topic Date Due    ANNUAL WELLNESS VISIT  01/15/2025    LIPID PANEL  01/15/2025    BMI FOLLOWUP  07/15/2025    DXA SCAN  10/09/2025    PAP SMEAR  10/06/2026    MAMMOGRAM  11/08/2026    TDAP/TD VACCINES (2 - Td or Tdap) 10/06/2031    COLORECTAL CANCER SCREENING  05/07/2034    HEPATITIS C SCREENING  Completed    COVID-19 Vaccine  Completed    INFLUENZA VACCINE  Completed    Pneumococcal Vaccine 65+  Completed    ZOSTER VACCINE  Completed                  CMS Preventative Services Quick Reference  Risk Factors Identified During Encounter  Fall Risk-High or Moderate: Discussed Fall Prevention in the home  The above risks/problems have been discussed with the patient.  Pertinent information has been shared with the patient in the After Visit Summary.  An After Visit Summary and PPPS were made  "available to the patient.    Follow Up:   Next Medicare Wellness visit to be scheduled in 1 year.       Additional E&M Note during same encounter follows:  Patient has multiple medical problems which are significant and separately identifiable that require additional work above and beyond the Medicare Wellness Visit.      Chief Complaint  Medicare Wellness-subsequent, Hypothyroidism, and Hyperlipidemia    Subjective        HPI  Jackie Frankel is also being seen today for follow up.  History of Present Illness  The patient is a 64-year-old female who presents for a subsequent Medicare wellness visit and follow-up on reflux and hypothyroidism.    She reports overall good health, with the exception of a bone spur on the plantar surface of her foot and a developing corn on her toe, which she finds particularly bothersome. She plans to consult a podiatrist for these issues in the future.    She adheres to her prescribed regimen of taking her thyroid medication on an empty stomach, 30 minutes prior to consuming any food or beverages other than water.    Her reflux symptoms are generally well-managed with medication, although she acknowledges that certain dietary indiscretions can trigger symptoms.    She has not experienced any chest pain or shortness of breath. She also notes that her weight tends to decrease when she maintains a home-cooked diet for several consecutive days.    FAMILY HISTORY  Her mother had an underactive thyroid, and her maternal grandmother had a goiter on her neck.            Objective   Vital Signs:  /58   Pulse 60   Temp 98.3 °F (36.8 °C)   Ht 152.4 cm (60\")   Wt 66.8 kg (147 lb 3.2 oz)   SpO2 99%   BMI 28.75 kg/m²     Physical Exam  Vitals and nursing note reviewed.   Constitutional:       General: She is not in acute distress.     Appearance: Normal appearance. She is well-developed.   HENT:      Head: Normocephalic and atraumatic.      Right Ear: Tympanic membrane, ear canal and " external ear normal.      Left Ear: Tympanic membrane, ear canal and external ear normal.      Nose: Nose normal.      Mouth/Throat:      Mouth: Mucous membranes are moist.      Pharynx: Oropharynx is clear. No oropharyngeal exudate or posterior oropharyngeal erythema.   Eyes:      Conjunctiva/sclera: Conjunctivae normal.      Pupils: Pupils are equal, round, and reactive to light.   Neck:      Thyroid: No thyromegaly.   Cardiovascular:      Rate and Rhythm: Normal rate and regular rhythm.      Heart sounds: No murmur heard.  Pulmonary:      Effort: Pulmonary effort is normal.      Breath sounds: Normal breath sounds. No wheezing.   Abdominal:      General: Abdomen is flat. Bowel sounds are normal. There is no distension.      Palpations: Abdomen is soft. There is no mass.      Tenderness: There is no abdominal tenderness.      Hernia: No hernia is present.   Musculoskeletal:         General: No swelling. Normal range of motion.      Cervical back: Normal range of motion and neck supple.      Right lower leg: No edema.      Left lower leg: No edema.   Lymphadenopathy:      Cervical: No cervical adenopathy.   Skin:     General: Skin is warm and dry.      Capillary Refill: Capillary refill takes less than 2 seconds.      Findings: No rash.   Neurological:      General: No focal deficit present.      Mental Status: She is alert and oriented to person, place, and time.      Cranial Nerves: No cranial nerve deficit.   Psychiatric:         Mood and Affect: Mood normal.         Behavior: Behavior normal.        Physical Exam  Vital Signs  BMI is 28.                Results  Laboratory Studies  Cholesterol levels were better than previous year. Glucose was slightly above normal at 102 six months ago.          Assessment and Plan   Diagnoses and all orders for this visit:    1. Medicare annual wellness visit, subsequent (Primary)    2. Postablative hypothyroidism  -     TSH  -     Vitamin B12  -     Lipid Panel  -     CBC &  Differential  -     Comprehensive Metabolic Panel  -     Hemoglobin A1c  -     Synthroid 100 MCG tablet; Take 1 tablet by mouth Daily.  Dispense: 90 tablet; Refill: 3    3. At moderate risk for fall    4. Abnormal glucose  -     Hemoglobin A1c    5. Gastroesophageal reflux disease without esophagitis  -     pantoprazole (PROTONIX) 40 MG EC tablet; Take 1 tablet by mouth Daily.  Dispense: 90 tablet; Refill: 3    6. HSV infection  -     valACYclovir (VALTREX) 500 MG tablet; Take 1 tablet by mouth Daily.  Dispense: 90 tablet; Refill: 3      Assessment & Plan  1. Hypothyroidism.  She is taking her thyroid medication on an empty stomach first thing in the morning, 30 minutes before eating or drinking anything other than water. Thyroid function will be checked annually.    2. Reflux.  Her reflux is generally controlled by her medication unless she eats something that triggers it. She is advised to avoid foods that exacerbate her symptoms.    3. Bone spur and corn.  She reports having a bone spur on the bottom of her foot and a corn on her toe. She is advised to see a podiatrist for these issues.    4. Abnormal glucose.  Her glucose level was slightly above normal at 102 six months ago. An A1c test will be conducted to check for prediabetes.    5. Medicare wellness.  Her cholesterol levels have improved compared to the previous year. Her BMI is 28, indicating a need for weight management. She is up to date with all other care gaps. Her last Pap smear was conducted 3 years ago. She is advised to aim for a weight of approximately 130 pounds. A full panel of tests will be ordered. The next Pap smear is scheduled for 2 years from now.            Follow Up   Return in about 6 months (around 7/16/2025) for Recheck TSH.  Patient was given instructions and counseling regarding her condition or for health maintenance advice. Please see specific information pulled into the AVS if appropriate.     Patient or patient representative  verbalized consent for the use of Ambient Listening during the visit with  Meredith Lea Kehrer, MD for chart documentation. 1/16/2025  09:43 EST

## 2025-01-16 NOTE — PATIENT INSTRUCTIONS
Fall Prevention in the Home, Adult  Falls can cause injuries and affect people of all ages. There are many simple things that you can do to make your home safe and to help prevent falls.  If you need it, ask for help making these changes.  What actions can I take to prevent falls?  General information  Use good lighting in all rooms. Make sure to:  Replace any light bulbs that burn out.  Turn on lights if it is dark and use night-lights.  Keep items that you use often in easy-to-reach places. Lower the shelves around your home if needed.  Move furniture so that there are clear paths around it.  Do not keep throw rugs or other things on the floor that can make you trip.  If any of your floors are uneven, fix them.  Add color or contrast paint or tape to clearly brandon and help you see:  Grab bars or handrails.  First and last steps of staircases.  Where the edge of each step is.  If you use a ladder or stepladder:  Make sure that it is fully opened. Do not climb a closed ladder.  Make sure the sides of the ladder are locked in place.  Have someone hold the ladder while you use it.  Know where your pets are as you move through your home.  What can I do in the bathroom?         Keep the floor dry. Clean up any water that is on the floor right away.  Remove soap buildup in the bathtub or shower. Buildup makes bathtubs and showers slippery.  Use non-skid mats or decals on the floor of the bathtub or shower.  Attach bath mats securely with double-sided, non-slip rug tape.  If you need to sit down while you are in the shower, use a non-slip stool.  Install grab bars by the toilet and in the bathtub and shower. Do not use towel bars as grab bars.  What can I do in the bedroom?  Make sure that you have a light by your bed that is easy to reach.  Do not use any sheets or blankets on your bed that hang to the floor.  Have a firm bench or chair with side arms that you can use for support when you get dressed.  What can I do in  the kitchen?  Clean up any spills right away.  If you need to reach something above you, use a sturdy step stool that has a grab bar.  Keep electrical cables out of the way.  Do not use floor polish or wax that makes floors slippery.  What can I do with my stairs?  Do not leave anything on the stairs.  Make sure that you have a light switch at the top and the bottom of the stairs. Have them installed if you do not have them.  Make sure that there are handrails on both sides of the stairs. Fix handrails that are broken or loose. Make sure that handrails are as long as the staircases.  Install non-slip stair treads on all stairs in your home if they do not have carpet.  Avoid having throw rugs at the top or bottom of stairs, or secure the rugs with carpet tape to prevent them from moving.  Choose a carpet design that does not hide the edge of steps on the stairs. Make sure that carpet is firmly attached to the stairs. Fix any carpet that is loose or worn.  What can I do on the outside of my home?  Use bright outdoor lighting.  Repair the edges of walkways and driveways and fix any cracks. Clear paths of anything that can make you trip, such as tools or rocks.  Add color or contrast paint or tape to clearly brandon and help you see high doorway thresholds.  Trim any bushes or trees on the main path into your home.  Check that handrails are securely fastened and in good repair. Both sides of all steps should have handrails.  Install guardrails along the edges of any raised decks or porches.  Have leaves, snow, and ice cleared regularly. Use sand, salt, or ice melt on walkways during winter months if you live where there is ice and snow.  In the garage, clean up any spills right away, including grease or oil spills.  What other actions can I take?  Review your medicines with your health care provider. Some medicines can make you confused or feel dizzy. This can increase your chance of falling.  Wear closed-toe shoes that  fit well and support your feet. Wear shoes that have rubber soles and low heels.  Use a cane, walker, scooter, or crutches that help you move around if needed.  Talk with your provider about other ways that you can decrease your risk of falls. This may include seeing a physical therapist to learn to do exercises to improve movement and strength.  Where to find more information  Centers for Disease Control and Prevention, [place]: cdc.gov  National [place] on Aging: pamela.nih.gov  National [place] on Aging: pamela.nih.gov  Contact a health care provider if:  You are afraid of falling at home.  You feel weak, drowsy, or dizzy at home.  You fall at home.  Get help right away if you:  Lose consciousness or have trouble moving after a fall.  Have a fall that causes a head injury.  These symptoms may be an emergency. Get help right away. Call 911.  Do not wait to see if the symptoms will go away.  Do not drive yourself to the hospital.  This information is not intended to replace advice given to you by your health care provider. Make sure you discuss any questions you have with your health care provider.  Document Revised: 08/21/2023 Document Reviewed: 08/21/2023  Elsevier Patient Education © 2024 [employer]

## 2025-01-17 LAB
ALBUMIN SERPL-MCNC: 4.3 G/DL (ref 3.5–5.2)
ALBUMIN/GLOB SERPL: 1.6 G/DL
ALP SERPL-CCNC: 72 U/L (ref 39–117)
ALT SERPL-CCNC: 24 U/L (ref 1–33)
AST SERPL-CCNC: 28 U/L (ref 1–32)
BASOPHILS # BLD AUTO: 0.06 10*3/MM3 (ref 0–0.2)
BASOPHILS NFR BLD AUTO: 1.3 % (ref 0–1.5)
BILIRUB SERPL-MCNC: 0.8 MG/DL (ref 0–1.2)
BUN SERPL-MCNC: 15 MG/DL (ref 8–23)
BUN/CREAT SERPL: 18.3 (ref 7–25)
CALCIUM SERPL-MCNC: 9.4 MG/DL (ref 8.6–10.5)
CHLORIDE SERPL-SCNC: 104 MMOL/L (ref 98–107)
CHOLEST SERPL-MCNC: 189 MG/DL (ref 0–200)
CO2 SERPL-SCNC: 25.6 MMOL/L (ref 22–29)
CREAT SERPL-MCNC: 0.82 MG/DL (ref 0.57–1)
EGFRCR SERPLBLD CKD-EPI 2021: 79 ML/MIN/1.73
EOSINOPHIL # BLD AUTO: 0.12 10*3/MM3 (ref 0–0.4)
EOSINOPHIL NFR BLD AUTO: 2.7 % (ref 0.3–6.2)
ERYTHROCYTE [DISTWIDTH] IN BLOOD BY AUTOMATED COUNT: 11.9 % (ref 12.3–15.4)
GLOBULIN SER CALC-MCNC: 2.7 GM/DL
GLUCOSE SERPL-MCNC: 90 MG/DL (ref 65–99)
HBA1C MFR BLD: 5.5 % (ref 4.8–5.6)
HCT VFR BLD AUTO: 39 % (ref 34–46.6)
HDLC SERPL-MCNC: 72 MG/DL (ref 40–60)
HGB BLD-MCNC: 13.2 G/DL (ref 12–15.9)
IMM GRANULOCYTES # BLD AUTO: 0.01 10*3/MM3 (ref 0–0.05)
IMM GRANULOCYTES NFR BLD AUTO: 0.2 % (ref 0–0.5)
LDLC SERPL CALC-MCNC: 109 MG/DL (ref 0–100)
LYMPHOCYTES # BLD AUTO: 1.41 10*3/MM3 (ref 0.7–3.1)
LYMPHOCYTES NFR BLD AUTO: 31.5 % (ref 19.6–45.3)
MCH RBC QN AUTO: 31.7 PG (ref 26.6–33)
MCHC RBC AUTO-ENTMCNC: 33.8 G/DL (ref 31.5–35.7)
MCV RBC AUTO: 93.5 FL (ref 79–97)
MONOCYTES # BLD AUTO: 0.45 10*3/MM3 (ref 0.1–0.9)
MONOCYTES NFR BLD AUTO: 10 % (ref 5–12)
NEUTROPHILS # BLD AUTO: 2.43 10*3/MM3 (ref 1.7–7)
NEUTROPHILS NFR BLD AUTO: 54.3 % (ref 42.7–76)
NRBC BLD AUTO-RTO: 0 /100 WBC (ref 0–0.2)
PLATELET # BLD AUTO: 267 10*3/MM3 (ref 140–450)
POTASSIUM SERPL-SCNC: 4.4 MMOL/L (ref 3.5–5.2)
PROT SERPL-MCNC: 7 G/DL (ref 6–8.5)
RBC # BLD AUTO: 4.17 10*6/MM3 (ref 3.77–5.28)
SODIUM SERPL-SCNC: 139 MMOL/L (ref 136–145)
TRIGL SERPL-MCNC: 41 MG/DL (ref 0–150)
TSH SERPL DL<=0.005 MIU/L-ACNC: 0.89 UIU/ML (ref 0.27–4.2)
VIT B12 SERPL-MCNC: 1188 PG/ML (ref 211–946)
VLDLC SERPL CALC-MCNC: 8 MG/DL (ref 5–40)
WBC # BLD AUTO: 4.48 10*3/MM3 (ref 3.4–10.8)

## 2025-05-20 ENCOUNTER — OFFICE VISIT (OUTPATIENT)
Dept: FAMILY MEDICINE CLINIC | Facility: CLINIC | Age: 65
End: 2025-05-20
Payer: MEDICARE

## 2025-05-20 VITALS
WEIGHT: 142.8 LBS | TEMPERATURE: 98.6 F | DIASTOLIC BLOOD PRESSURE: 64 MMHG | BODY MASS INDEX: 28.03 KG/M2 | HEART RATE: 72 BPM | SYSTOLIC BLOOD PRESSURE: 128 MMHG | HEIGHT: 60 IN | OXYGEN SATURATION: 100 %

## 2025-05-20 DIAGNOSIS — R30.0 DYSURIA: Primary | ICD-10-CM

## 2025-05-20 LAB
BILIRUB BLD-MCNC: NEGATIVE MG/DL
CLARITY, POC: CLEAR
COLOR UR: YELLOW
EXPIRATION DATE: ABNORMAL
GLUCOSE UR STRIP-MCNC: NEGATIVE MG/DL
KETONES UR QL: NEGATIVE
LEUKOCYTE EST, POC: ABNORMAL
Lab: ABNORMAL
NITRITE UR-MCNC: NEGATIVE MG/ML
PH UR: 6 [PH] (ref 5–8)
PROT UR STRIP-MCNC: ABNORMAL MG/DL
RBC # UR STRIP: NEGATIVE /UL
SP GR UR: 1.01 (ref 1–1.03)
UROBILINOGEN UR QL: NORMAL

## 2025-05-20 PROCEDURE — 81003 URINALYSIS AUTO W/O SCOPE: CPT | Performed by: FAMILY MEDICINE

## 2025-05-20 PROCEDURE — 1126F AMNT PAIN NOTED NONE PRSNT: CPT | Performed by: FAMILY MEDICINE

## 2025-05-20 PROCEDURE — 99213 OFFICE O/P EST LOW 20 MIN: CPT | Performed by: FAMILY MEDICINE

## 2025-05-20 NOTE — PROGRESS NOTES
"Chief Complaint  Urinary Tract Infection    Subjective        Jackie Frankel presents to [facility]  History of Present Illness  History of Present Illness  The patient is a 65-year-old female who presents for concerns of urinary frequency.    She reports intermittent episodes of urinary frequency over the past 2 to 3 weeks, with each episode lasting approximately 1 to 1.5 days. She recalls an incident on a recent Friday afternoon where she experienced an incessant urge to urinate, which persisted until she retired for the night. Upon awakening the following morning, the symptoms had subsided. She reports no associated vaginal pain or discharge, hematuria, or malodorous urine. Additionally, she does not experience any back pain, nausea, vomiting, fever, or chills. However, she does report occasional hip joint soreness, which she does not believe is related to her current symptoms. She has procured Azo over-the-counter medication for potential use.       Objective   Vital Signs:  /64   Pulse 72   Temp 98.6 °F (37 °C)   Ht 152.4 cm (60\")   Wt 64.8 kg (142 lb 12.8 oz)   SpO2 100%   BMI 27.89 kg/m²   Estimated body mass index is 27.89 kg/m² as calculated from the following:    Height as of this encounter: 152.4 cm (60\").    Weight as of this encounter: 64.8 kg (142 lb 12.8 oz).               Physical Exam   Physical Exam  General: Alert, in no acute distress  Genitourinary: No CVA tenderness, no pain upon palpation of the bladder       Result Review :          Results  Labs   - Urine test: Slight leukocytes, no gross infection   POCT urinalysis dipstick, automated (05/20/2025 14:08)            Assessment and Plan     Diagnoses and all orders for this visit:    1. Dysuria (Primary)  -     POCT urinalysis dipstick, automated  -     Urine Culture - Urine, Urine, Clean Catch; Future  -     Urine Culture - Urine, Urine, Clean Catch      Assessment & Plan  1. Urinary frequency.  - " Intermittent episodes of urinary urgency over the past 2-3 weeks.  - Urine sample showed slight leukocytes without gross infection.  - Discussed the plan to wait for culture results before starting antibiotics.  - Advised to use over-the counter AZO. Urine culture ordered with results expected by 05/23/2025.            Follow Up     No follow-ups on file.  Patient was given instructions and counseling regarding her condition or for health maintenance advice. Please see specific information pulled into the AVS if appropriate.    Patient or patient representative verbalized consent for the use of Ambient Listening during the visit with  Meredith Lea Kehrer, MD for chart documentation. 5/20/2025  14:20 EDT

## 2025-05-22 LAB
BACTERIA UR CULT: NORMAL
BACTERIA UR CULT: NORMAL

## 2025-05-27 RX ORDER — IBUPROFEN 800 MG/1
800 TABLET, FILM COATED ORAL EVERY 6 HOURS PRN
Qty: 90 TABLET | Refills: 0 | Status: SHIPPED | OUTPATIENT
Start: 2025-05-27

## 2025-05-27 NOTE — TELEPHONE ENCOUNTER
Rx Refill Note  Requested Prescriptions     Pending Prescriptions Disp Refills    ibuprofen (ADVIL,MOTRIN) 800 MG tablet [Pharmacy Med Name: IBUPROFEN 800 MG TABLET] 90 tablet 0     Sig: TAKE 1 TABLET BY MOUTH EVERY 6 HOURS AS NEEDED FOR MILD PAIN      Last office visit with prescribing clinician: 5/20/2025   Last telemedicine visit with prescribing clinician: Visit date not found   Next office visit with prescribing clinician: 7/31/2025                         Would you like a call back once the refill request has been completed: [] Yes [] No    If the office needs to give you a call back, can they leave a voicemail: [] Yes [] No    Lourdes Bashir MA  05/27/25, 08:29 EDT

## 2025-07-31 ENCOUNTER — OFFICE VISIT (OUTPATIENT)
Dept: FAMILY MEDICINE CLINIC | Facility: CLINIC | Age: 65
End: 2025-07-31
Payer: MEDICARE

## 2025-07-31 VITALS
BODY MASS INDEX: 27.37 KG/M2 | DIASTOLIC BLOOD PRESSURE: 52 MMHG | HEART RATE: 64 BPM | WEIGHT: 139.4 LBS | OXYGEN SATURATION: 98 % | TEMPERATURE: 97.2 F | SYSTOLIC BLOOD PRESSURE: 108 MMHG | HEIGHT: 60 IN

## 2025-07-31 DIAGNOSIS — K21.9 GASTROESOPHAGEAL REFLUX DISEASE WITHOUT ESOPHAGITIS: ICD-10-CM

## 2025-07-31 DIAGNOSIS — E89.0 POSTABLATIVE HYPOTHYROIDISM: Primary | ICD-10-CM

## 2025-07-31 PROCEDURE — 1159F MED LIST DOCD IN RCRD: CPT | Performed by: FAMILY MEDICINE

## 2025-07-31 PROCEDURE — 1126F AMNT PAIN NOTED NONE PRSNT: CPT | Performed by: FAMILY MEDICINE

## 2025-07-31 PROCEDURE — 99213 OFFICE O/P EST LOW 20 MIN: CPT | Performed by: FAMILY MEDICINE

## 2025-07-31 PROCEDURE — 1160F RVW MEDS BY RX/DR IN RCRD: CPT | Performed by: FAMILY MEDICINE

## 2025-07-31 NOTE — PROGRESS NOTES
Chief Complaint  Med Refill, Hypothyroidism, and Hyperlipidemia    Subjective        Jackie Frankel presents to [facility]  Hypothyroidism  Patient reports no diaphoresis, fatigue or visual change. Her past medical history is significant for hyperlipidemia.   Hyperlipidemia  Exacerbating diseases include hypothyroidism. Pertinent negatives include no chest pain or myalgias.     Thyroid checkup  Symptoms are: chronic.   Onset was more than 5 years.   Pertinent negative symptoms include no abdominal pain, no anorexia, no joint pain, no change in stool, no chest pain, no chills, no congestion, no cough, no diaphoresis, no fatigue, no fever, no headaches, no joint swelling, no myalgias, no nausea, no neck pain, no numbness, no rash, no sore throat, no swollen glands, no dysuria, no vertigo, no visual change, no vomiting and no weakness.   Treatment and/or Medications comments include: Synthroid     History of Present Illness  The patient is a 65-year-old female who presents for a mid-year checkup on hypothyroidism and reflux.    She reports that her reflux condition is currently well-managed. She has not attempted to reduce her medication intake due to her fondness for tomatoes, which she consumes frequently during this season. She notes that yellow tomatoes are less acidic and therefore more tolerable for her. Despite an allergy to tomatoes, she continues to consume them in moderation throughout the year, particularly in salads. She also mentions that she experiences increased symptoms during the summer months.    She has experienced some weight loss since January 2025, maintaining a weight in the 130s and avoiding the 140s. She recalls experiencing knee issues prior to her diagnosis.    Social History:  Diet: Consumes tomatoes frequently during the summer season, particularly yellow tomatoes due to their lower acidity.       Objective   Vital Signs:  /52   Pulse 64   Temp 97.2 °F  "(36.2 °C)   Ht 152.4 cm (60\")   Wt 63.2 kg (139 lb 6.4 oz)   SpO2 98%   BMI 27.22 kg/m²   Estimated body mass index is 27.22 kg/m² as calculated from the following:    Height as of this encounter: 152.4 cm (60\").    Weight as of this encounter: 63.2 kg (139 lb 6.4 oz).             Physical Exam   Physical Exam  General: Patient is alert and in no acute distress.  Neck: Supple  Respiratory: Clear to auscultation, no wheezing, rales or rhonchi  Cardiovascular: Regular rate and rhythm, no murmurs, rubs, or gallops       Result Review :          Results  Labs   - Annual Labs: Normal              Assessment and Plan     Diagnoses and all orders for this visit:    1. Postablative hypothyroidism (Primary)  -     TSH    2. Gastroesophageal reflux disease without esophagitis      Assessment & Plan  1. Hypothyroidism.  - No new thyroid symptoms reported.  - Physical exam findings: patient alert, no acute distress, no thyroid symptoms.  - TSH test ordered today to ensure levels are within the normal range.  - Weight loss noted; patient down 8 pounds since last visit, currently in the 130s.    2. Gastroesophageal Reflux Disease (GERD).  - Reflux is currently under control.  - No attempt to wean off medication due to dietary habits, particularly consumption of tomatoes during the summer.  - Physical exam findings: heart regular, lungs clear, neck supple.  - No changes in medication or therapy discussed.            Follow Up     Return in about 6 months (around 1/31/2026) for Medicare Wellness.  Patient was given instructions and counseling regarding her condition or for health maintenance advice. Please see specific information pulled into the AVS if appropriate.    Patient or patient representative verbalized consent for the use of Ambient Listening during the visit with  Meredith Lea Kehrer, MD for chart documentation. 8/1/2025  15:30 EDT      "

## 2025-08-01 LAB — TSH SERPL DL<=0.005 MIU/L-ACNC: 0.22 UIU/ML (ref 0.45–4.5)

## 2025-08-14 ENCOUNTER — TELEPHONE (OUTPATIENT)
Dept: FAMILY MEDICINE CLINIC | Facility: CLINIC | Age: 65
End: 2025-08-14
Payer: MEDICARE

## 2025-08-14 DIAGNOSIS — E89.0 POSTABLATIVE HYPOTHYROIDISM: Primary | ICD-10-CM

## 2025-08-14 RX ORDER — LEVOTHYROXINE SODIUM 88 MCG
88 TABLET ORAL
Qty: 90 TABLET | Refills: 1 | Status: SHIPPED | OUTPATIENT
Start: 2025-08-14